# Patient Record
Sex: FEMALE | Race: WHITE | NOT HISPANIC OR LATINO | Employment: FULL TIME | URBAN - METROPOLITAN AREA
[De-identification: names, ages, dates, MRNs, and addresses within clinical notes are randomized per-mention and may not be internally consistent; named-entity substitution may affect disease eponyms.]

---

## 2018-08-28 ENCOUNTER — TELEPHONE (OUTPATIENT)
Dept: GASTROENTEROLOGY | Facility: CLINIC | Age: 33
End: 2018-08-28

## 2018-08-28 NOTE — TELEPHONE ENCOUNTER
----- Message from Milana Vicente sent at 8/28/2018  2:40 PM CDT -----  Regarding: Outside patient referral  Good afternoon,    Patient is being referred to Dr. Jameson for ulcerative colitis. Referral and records are scanned into . Please advise of any additional information needed prior to scheduling.    Thanks!  Milana

## 2018-08-29 ENCOUNTER — TELEPHONE (OUTPATIENT)
Dept: GASTROENTEROLOGY | Facility: CLINIC | Age: 33
End: 2018-08-29

## 2018-08-29 NOTE — TELEPHONE ENCOUNTER
----- Message from Kylee Birch MA sent at 8/29/2018  3:38 PM CDT -----  Contact: [phone]  Patient Returning Call from [facility]    Who Left Message for Patient: Eulalia    Communication Preference: [phone]    Additional Information: re: outside records info and appt access

## 2018-09-26 ENCOUNTER — TELEPHONE (OUTPATIENT)
Dept: GASTROENTEROLOGY | Facility: CLINIC | Age: 33
End: 2018-09-26

## 2018-09-26 NOTE — TELEPHONE ENCOUNTER
Pt is scheduled for a new patient clinic appt with Dr. CLEOPATRA Jameson on 10/1/2018.    E-mail sent to pt with appt reminder, new patient welcome letter, as well as a campus map.

## 2018-09-27 NOTE — PROGRESS NOTES
"                    [facility]          Inflammatory Bowel Disease New Patient Consultation Note         TODAY'S VISIT DATE:  9/27/2018    Reason for Consult:    Chief Complaint   Patient presents with    Ulcerative Colitis     PCP: No primary care provider on file.      Referring MD:   Dr. Rolly Stephens  [address] [place] [address]    History of Present Illness:  Dear. Dr. Stephens    Thank you for requesting a consultation on your patient Emily Mcclellan who is a 33 y.o. 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  Workup at that time included colonoscopy showed internal hemorrhoid and perhaps a "polyp."  She had celiac testing done which was negative. The continued with these symptoms occasionally but symptoms became more frequent and she was placed on bentyl which did not help. Immodium would provide some help.  Eliminating dairy and red meat did not help.  In 12/2013 she was hospitalized for 5 days with symptoms of diarrhea (up to 10 BMs/day that are soft with blood) and new rash (bilateral LE- small macular erythema and focal tenederness that advanced to raised plaque like lesions with pruritus and worsening lesion on dorsal foot with ankle pain). She had developed LE purpura and edema with biopsies c/w with leukocytoclastic vasculitis with HSP. At that time she had abdominal discomfort and bloody stool. Colonoscopy on 12/19/13 showed essentially pan ulcerative colitis however inability to fully rule out IgA vasculitis involving the colon. Colonoscopy showed specifically " inflammation characterized by friability, granularity, loss of vascularity, confluent ulceration found in continuous and circumferential pattern from the rectum to the cecum and moderate severity in the ascending and cecum.  Biopsies of the ascending colon and left colon showed moderately active chronic colitis.  The TI was normal including biopsies of the TI. For her skin lesions she was given triamcinolone 0.1% cream and for her new onset diagnosis of ulcerative colitis she started IV solumedrol and then prednisone 40 mg po daily.  She met Dr. Stephens at [facility] on 1/6/14 at which time he planned on tapering prednisone 40 mg po dailiy and then started lialda 4.8 g/day though referred to nephrology to make sure there was no kidney involvement of HSP and did not feel kidney biopsy was needed.  Dr. Knight with nephrology at [facility] saw patient on 2/27/14 and remarked UA earlier that week showed 3+ blood c/w microscopic hematuria (menstruating so false positive determined).      By 4/2014 she saw GI and was feeling well and had started tapering prednisone and on lialda 4.8 g/day. She was getting monthly UAs with nephrology due to microscopic hematuria with a plan for flex sig which was essentially normal in 9/23/14 with biopsies normal.  In 12/2014 (stool calprotectin 460) she had mild symptoms with urgency and blood in stool and increased frequency. On 1/28/15 patient had flex sig which showed moderately active proctosigmoiditis with biopsies confirming active IBD. She took another course of prednisone and then added imuran 100 mg po daily and started simponi 2/2015.  Patient seen by dermatology 5/19/16 for multiple melanocytic nevi though no worrisome findings for skin cancer and was told to f/u in 1 year. Patient saw Dr. Stephens 6/26/17 at which time she was in remission on Simponi, azathioprine and lialda 4.8 g/day.  It was recommended that she have a repeat flex sig winter 4161-3984. She is currently 27  weeks pregnant and due date 18.     She is currently having 1-2 formed BMs/day with no urgency blood or nocturnal bowel movements. Dr. Sara Hirsch is her OB and she is seeing her tomorrow. She has had nausea/vomiting throughout with pregnancy and some associated weight loss (doxylamine/B6 prescribed but not taking). She has is having a lot of heartburn with some improvement with zantac. Patient has no other gastrointestinal/constitutional complaints including no fevers or chills, weight loss, nausea/vomiting (including no hematemesis), dysphagia, abdominal pain. Also patient does not have any extraintestinal manifestations of their inflammatory bowel disease including no eye pain/redness, skin lesions/rashes, joint problems, oral ulcers.    Prior Pertinent Surgeries:  None    Pertinent Endoscopy/Imagin2013 skin biopsy: leukocytoclastic vasculitis with HSP- path- IgA deposits around vessels  2013 colonoscopy: inflammation characterized by friability, granularity, loss of vascularity, confluent ulceration found in continuous and circumferential pattern from the rectum to the cecum, moderate in severity in the ascending and cecum (path-ascending, left colon: moderately active chronic colitis); normal TI up to 5 cm (path: normal)  2014 flex sig: no report (path-rectum, sigmoid: normal)  2015 flex sig: rectum was diffusely erythematous and friable; there was mucous noted and spontaneous hemorrhage (path-rectum: diffuse moderately active IBD; no abnormal immunoglobulin deposition)    Pertinent Labs:  14 stool calprotectin 460  2/19/15 TB quantiferon negative  2/19/15 TPMT normal 16.4  2/19/15 HBsAb positive  2/19/15 HBtotal Core Ab negative   3/7/16 HBsAg negative    Therapeutic Drug Monitoring Labs:  3/26/15 6TG 130/6MMP <5700  4/29/15 6TG 210, 6MMP 6408    Prior IBD Therapies:  Prednisone    Current IBD Therapies:  Simponi 100 mg SC every 4 weeks  Azathioprine 100 mg po daily  Lialda  4.8 gm/day    Vaccinations:  Influenza (inactive):  2017  Pneumococcal PCV 13: never had  Pneumococcal PCV 23: never had  Tetanus (TdaP): recommended every 10 years  HPV (males and females ages 19-27 yo):     NA  Meningococcal (risk factors- complement component deficiency, spleen damage or splenectomy, HIV, traveling to endemic areas, college student residing in residence [place],  recruits):  Likely had but will get vaccine records  Hepatitis B:  HBsAb positive 2/19/15  No results found for: HEPBSAB  Hepatitis A (risk factors- traveling to high endemic areas, chronic liver disease, clotting factor disorders, MSM, illicit drug users):   HAV IgG ordered today  No results found for: HEPAIGG  MMR (live vaccine):    3/2016  Chickenpox status/Varicella (live vaccine):  Had chickenpox as a child  No results found for: VARICELLAZOS, VARICELLAINT  Shingrix:  Recommended     NSAID use/indication:  None    Narcotic use:  None    Alternative/Complementary Meds for IBD:  None    Review of Systems   Constitutional: Negative for chills, fever and weight loss.   HENT:        No oral ulcers, dysphagia, oral thrush   Eyes: Negative for blurred vision, pain and redness.   Respiratory: Negative for cough and shortness of breath.    Cardiovascular: Negative for chest pain.   Gastrointestinal: Negative for abdominal pain, heartburn, nausea and vomiting.   Genitourinary: Negative for dysuria and hematuria.   Musculoskeletal: Negative for back pain and joint pain.   Skin: Negative for rash.   Psychiatric/Behavioral: Negative for depression. The patient is not nervous/anxious and does not have insomnia.      Medical/Surgical History:    has a past medical history of GERD (gastroesophageal reflux disease) and Ulcerative colitis.   has a past surgical history that includes Tonsillectomy.    Family History:   family history includes Inflammatory bowel disease in her maternal aunt; Skin cancer in her maternal aunt and maternal  "grandmother.    Social History:    reports that  has never smoked. she has never used smokeless tobacco. She reports that she drinks about 0.6 oz of alcohol per week. She reports that she does not use drugs.    Review of patient's allergies indicates:  No Known Allergies    Current Medications:   Outpatient Medications Marked as Taking for the 10/1/18 encounter (Office Visit) with Ryder Jameson MD   Medication Sig Dispense Refill    azaTHIOprine (IMURAN) 50 mg Tab Take 100 mg by mouth once daily.      doxylamine succinate (UNISOM, DOXYLAMINE,) 25 mg tablet Take 25 mg by mouth nightly as needed for Insomnia.      golimumab (SIMPONI) 100 mg/mL PnIj Inject 100 mg into the skin every 28 days.      mesalamine (LIALDA) 1.2 gram TbEC Take 4.8 g by mouth once daily.      prenatal vit/iron fum/folic ac (PRENATAL 1+1 ORAL) Take 1 tablet by mouth once daily.      pyridoxine, vitamin B6, (VITAMIN B-6) 25 MG Tab Take 25 mg by mouth 3 (three) times daily.      ranitidine (ZANTAC) 150 MG tablet Take 150 mg by mouth 2 (two) times daily.       Vital Signs:  /68 (BP Location: Right arm, Patient Position: Sitting)   Pulse 79 Comment: O2: 99%  Temp 98.6 °F (37 °C)   Ht 5' 5" (1.651 m)   Wt 71.8 kg (158 lb 4.6 oz)   BMI 26.34 kg/m²     Physical Exam   Constitutional: She is oriented to person, place, and time. She appears well-developed.   HENT:   Mouth/Throat: Oropharynx is clear and moist. No oral lesions.   No oral ulcers or thrush   Eyes: Conjunctivae are normal. Pupils are equal, round, and reactive to light.   Cardiovascular: Normal rate and regular rhythm.   Pulmonary/Chest: Effort normal and breath sounds normal.   Abdominal: Soft. There is no tenderness.   Palpable fundus   Musculoskeletal:        Right lower leg: She exhibits no swelling.        Left lower leg: She exhibits no swelling.   Neurological: She is alert and oriented to person, place, and time.   Skin: No rash noted.   Psychiatric: She has a " "normal mood and affect.   Nursing note and vitals reviewed.    Labs: reviewed and pertinent noted above    Assessment/Plan:  Emily Mcclellan is a 33 y.o. 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  She developed symptoms of LLQ abdominal pain with mucous, rectal pain, and occasional blood mixed in her stool in approximately 2009.  She had a workup that included a colonoscopy that was normal except for some internal hemorrhoids and perhaps a "polyp" and negative celiac testing.  She continued with symptoms which became more frequent that was not relieved with bentyl or eliminating dairy or red meat.  Imodium did help some.  She developed worsening diarrhea (up to 10 BMs/day) that was soft with blood and a new rash (bilateral LE- small macular erythema and focal tenederness that advanced to raised plaque like lesions with pruritus and worsening lesion on dorsal foot with ankle pain) that prompted a hospitalization for 5 days.  While in the hospital, she developed LE purpura and edema with biopsies consistent with leukocytoclastic vasculitis with HSP.  She had a colonoscopy on 12/19/2013 that showed ulcerative pancolitis with normal TI however inability to fully r/o IgA vasculitis involving the colon.  For her skin lesions, she was given triamcinolone 0.1% cream and for her new onset diagnosis of UC she was started on IV solumedrol then transitioned to oral prednisone 40 mg PO daily.  She established care with Dr. Rolly Stephens at [facility] on 1/6/2014 st which time he tapered her prednisone and started her on lialda 4.8 gm/day then referred her to nephrology to ensure there was no kidney involvement of HSP.  Dr. Knigth with nephrology at [facility] saw patient on 2/27/14 and remarked UA earlier that week showed 3+ blood c/w microscopic hematuria (menstruating so false positive determined) and did not feel that a kidney biopsy was needed.  She continued monthly " UAs and flex sig on 2014 was essentially normal with normal biopsies.  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  She had another course of prednisone and was started on imuran 100 mg PO daily and simponi in 2015.  By 2017 she had a clinic f/u with Dr. Stephens and was in clinic remission on simponi, lialda, and imuran.  He recommended a repeat flex sig in winter 6106-3470.  She became pregnant and is currently 27 weeks with a due date of 2018 and feeling well with 1-2 formed BMs/day.  She remains on Simponi 100 mg every 4 weeks, Imuran 50 mg PO daily, and Lialda 4.8 gm/day.      Patient is currently 27 weeks pregnant and is in clinical remission for her UC at conception and currently. The majority of her symptoms are related to her pregnancy including nausea/vomiting, fatigue.  She had no active GI symptoms. Today she is establishing care for her UC in our IBD clinic after moving to [place].  We will get labs and baseline stool calprotectin today with a plan for a colonoscopy after delivery.  We discussed timing of her simponi and for now will keep as scheduled given that its monthly and will not be able to stop during 3rd trimester without risk of flare. She continues on lialda 4.8 g/day and imuran 50 mg daily at this time and we will consider possibly stopping imuran or lialda after pregnancy.  We reminded her no live vaccines for the  for first 6 mos of life and breastfeeding is safe on simponi. Ideally if she can not breastfeed 4 hours after an imuran dose that would be ideal.  We will see her postpartum to discuss her care further.  We gave her written list of risks with imuran and simponi and its safety during pregnancy.      # Ulcerative pancolitis:  - I had a long discussion with patient regarding  epidemiology, potential etiologies, associations and triggers (avoiding NSAIDS, using antibiotics with caution,stress and smoking effects on disease state), diagnosis, management goals and treatment options   - colonoscopy postpartum will be considered   - stool calprotectin  - continue simponi 100 mg SC every 4 weeks  - continue imuran 50 mg po daily  - continue lialda 4.8 g/day- may consider stopping in future but will discuss risks/benefits and won't stop during pregnancy or immediately postpartum  - basic labs: CBC, CMP, ESR, CRP, vitamin B12, HCV Ab, HIV, thyroid testing, celiac Ab testing   - drug monitoring labs: TPMT (normal 16.4, 2015), TB quantiferon (negative 2015, repeat today), Hep B testing (HBsAg, HBtotalcoreAb negative 2015, 3/2016)    # Pregnancy-27 weeks pregnant  - no Live vaccines for the  for the first 6 months after birth  - breastfeeding is safe after delivery and try to avoid breastfeeding 4 hours after imuran ingestion    # IBD specific health maintenance:  Colorectal cancer risk:    Risks factors: personal history of colon polyps ( possible polyp- pt will get us the path and colonoscopy results from [place]) and >1/3 of colon involved with colitis and >8-10 years of IBD duration  - Distribution of colonic disease:  pancolitis  - Year of symptom onset:   - colonoscopy:  every 1-2 years for surveillance once in endoscopic remission    Pap smear recommended yearly - deferred to OBGYN while pregnant  Opthamologic exam recommended yearly - next due now  Dermatologic exam recommended yearly - next due now, last one     Bone health:  Risk of osteopenia/osteoporosis:  Risks factors: none  Vitamin D: vitamin D level ordered today    Vaccine counseling:  - No LIVE VACCINES--reminded in clinic today  - VZV IgG, HAV IgG  - referral to Infectious Disease clinic to get up to date on vaccinations- referral post-partum    Follow up: 2019 after delivery and pt will keep us posted  about her delivery    Thank you again for sending Emily Mcclellan to see Dr. Ryder Jameson today at the [facility]. Please don't hesitate to contact Dr. Jameson if there are any questions regarding this evaluation, or if you have any other patients with inflammatory bowel disease for whom you would like a consultation. You can reach Dr. Jameson at [phone] or by email at [email or web address]    History, physical exam, assessment and plan were performed by me personally. NP, Erin Cruz was present during entire visit     Ryder Jameson MD  Department of Gastroenterology  Medical Director, Inflammatory Bowel Disease

## 2018-10-01 ENCOUNTER — OFFICE VISIT (OUTPATIENT)
Dept: GASTROENTEROLOGY | Facility: CLINIC | Age: 33
End: 2018-10-01
Payer: COMMERCIAL

## 2018-10-01 ENCOUNTER — PATIENT MESSAGE (OUTPATIENT)
Dept: GASTROENTEROLOGY | Facility: CLINIC | Age: 33
End: 2018-10-01

## 2018-10-01 ENCOUNTER — TELEPHONE (OUTPATIENT)
Dept: PHARMACY | Facility: CLINIC | Age: 33
End: 2018-10-01

## 2018-10-01 ENCOUNTER — LAB VISIT (OUTPATIENT)
Dept: LAB | Facility: HOSPITAL | Age: 33
End: 2018-10-01
Attending: INTERNAL MEDICINE
Payer: COMMERCIAL

## 2018-10-01 VITALS
TEMPERATURE: 99 F | SYSTOLIC BLOOD PRESSURE: 115 MMHG | DIASTOLIC BLOOD PRESSURE: 68 MMHG | HEART RATE: 79 BPM | BODY MASS INDEX: 26.38 KG/M2 | HEIGHT: 65 IN | WEIGHT: 158.31 LBS

## 2018-10-01 DIAGNOSIS — K51.00 ULCERATIVE PANCOLITIS: ICD-10-CM

## 2018-10-01 DIAGNOSIS — Z3A.27 27 WEEKS GESTATION OF PREGNANCY: ICD-10-CM

## 2018-10-01 DIAGNOSIS — K51.00 ULCERATIVE PANCOLITIS: Primary | ICD-10-CM

## 2018-10-01 LAB
25(OH)D3+25(OH)D2 SERPL-MCNC: 31 NG/ML
ALBUMIN SERPL BCP-MCNC: 3.1 G/DL
ALP SERPL-CCNC: 63 U/L
ALT SERPL W/O P-5'-P-CCNC: 11 U/L
ANION GAP SERPL CALC-SCNC: 8 MMOL/L
AST SERPL-CCNC: 16 U/L
BASOPHILS # BLD AUTO: 0.03 K/UL
BASOPHILS NFR BLD: 0.3 %
BILIRUB SERPL-MCNC: 0.3 MG/DL
BUN SERPL-MCNC: 8 MG/DL
CALCIUM SERPL-MCNC: 9 MG/DL
CHLORIDE SERPL-SCNC: 105 MMOL/L
CO2 SERPL-SCNC: 23 MMOL/L
CREAT SERPL-MCNC: 0.6 MG/DL
CRP SERPL-MCNC: 0.8 MG/L
DIFFERENTIAL METHOD: ABNORMAL
EOSINOPHIL # BLD AUTO: 0.2 K/UL
EOSINOPHIL NFR BLD: 1.8 %
ERYTHROCYTE [DISTWIDTH] IN BLOOD BY AUTOMATED COUNT: 13.9 %
EST. GFR  (AFRICAN AMERICAN): >60 ML/MIN/1.73 M^2
EST. GFR  (NON AFRICAN AMERICAN): >60 ML/MIN/1.73 M^2
GLUCOSE SERPL-MCNC: 85 MG/DL
HBV CORE AB SERPL QL IA: NEGATIVE
HBV SURFACE AB SER-ACNC: POSITIVE M[IU]/ML
HBV SURFACE AG SERPL QL IA: NEGATIVE
HCT VFR BLD AUTO: 32.9 %
HEPATITIS A ANTIBODY, IGG: POSITIVE
HGB BLD-MCNC: 10.7 G/DL
IGA SERPL-MCNC: 75 MG/DL
IMM GRANULOCYTES # BLD AUTO: 0.08 K/UL
IMM GRANULOCYTES NFR BLD AUTO: 0.8 %
LYMPHOCYTES # BLD AUTO: 1.9 K/UL
LYMPHOCYTES NFR BLD: 19.2 %
MCH RBC QN AUTO: 32 PG
MCHC RBC AUTO-ENTMCNC: 32.5 G/DL
MCV RBC AUTO: 99 FL
MONOCYTES # BLD AUTO: 0.4 K/UL
MONOCYTES NFR BLD: 4.2 %
NEUTROPHILS # BLD AUTO: 7.4 K/UL
NEUTROPHILS NFR BLD: 73.7 %
NRBC BLD-RTO: 0 /100 WBC
PLATELET # BLD AUTO: 218 K/UL
PMV BLD AUTO: 10 FL
POTASSIUM SERPL-SCNC: 3.6 MMOL/L
PROT SERPL-MCNC: 6.8 G/DL
RBC # BLD AUTO: 3.34 M/UL
SODIUM SERPL-SCNC: 136 MMOL/L
T4 FREE SERPL-MCNC: 0.94 NG/DL
TSH SERPL DL<=0.005 MIU/L-ACNC: 0.97 UIU/ML
VIT B12 SERPL-MCNC: 181 PG/ML
WBC # BLD AUTO: 10 K/UL

## 2018-10-01 PROCEDURE — 82607 VITAMIN B-12: CPT

## 2018-10-01 PROCEDURE — 83516 IMMUNOASSAY NONANTIBODY: CPT

## 2018-10-01 PROCEDURE — 86704 HEP B CORE ANTIBODY TOTAL: CPT

## 2018-10-01 PROCEDURE — 99214 OFFICE O/P EST MOD 30 MIN: CPT | Mod: PBBFAC | Performed by: INTERNAL MEDICINE

## 2018-10-01 PROCEDURE — 84439 ASSAY OF FREE THYROXINE: CPT

## 2018-10-01 PROCEDURE — 82306 VITAMIN D 25 HYDROXY: CPT

## 2018-10-01 PROCEDURE — 84443 ASSAY THYROID STIM HORMONE: CPT

## 2018-10-01 PROCEDURE — 86706 HEP B SURFACE ANTIBODY: CPT

## 2018-10-01 PROCEDURE — 86787 VARICELLA-ZOSTER ANTIBODY: CPT

## 2018-10-01 PROCEDURE — 87340 HEPATITIS B SURFACE AG IA: CPT

## 2018-10-01 PROCEDURE — 36415 COLL VENOUS BLD VENIPUNCTURE: CPT

## 2018-10-01 PROCEDURE — 86140 C-REACTIVE PROTEIN: CPT

## 2018-10-01 PROCEDURE — 99999 PR PBB SHADOW E&M-EST. PATIENT-LVL IV: CPT | Mod: PBBFAC,,, | Performed by: INTERNAL MEDICINE

## 2018-10-01 PROCEDURE — 85025 COMPLETE CBC W/AUTO DIFF WBC: CPT

## 2018-10-01 PROCEDURE — 80053 COMPREHEN METABOLIC PANEL: CPT

## 2018-10-01 PROCEDURE — 99245 OFF/OP CONSLTJ NEW/EST HI 55: CPT | Mod: S$GLB,,, | Performed by: INTERNAL MEDICINE

## 2018-10-01 PROCEDURE — 82542 COL CHROMOTOGRAPHY QUAL/QUAN: CPT

## 2018-10-01 PROCEDURE — 82784 ASSAY IGA/IGD/IGG/IGM EACH: CPT

## 2018-10-01 PROCEDURE — 86790 VIRUS ANTIBODY NOS: CPT

## 2018-10-01 RX ORDER — MESALAMINE 1.2 G/1
4.8 TABLET, DELAYED RELEASE ORAL DAILY
COMMUNITY
Start: 2018-07-24 | End: 2018-10-01 | Stop reason: SDUPTHER

## 2018-10-01 RX ORDER — AZATHIOPRINE 50 MG/1
100 TABLET ORAL DAILY
Qty: 60 TABLET | Refills: 2 | Status: SHIPPED | OUTPATIENT
Start: 2018-10-01 | End: 2018-12-14 | Stop reason: SDUPTHER

## 2018-10-01 RX ORDER — AZATHIOPRINE 50 MG/1
100 TABLET ORAL DAILY
COMMUNITY
Start: 2018-07-24 | End: 2018-10-01 | Stop reason: SDUPTHER

## 2018-10-01 RX ORDER — PYRIDOXINE HCL (VITAMIN B6) 25 MG
25 TABLET ORAL 3 TIMES DAILY
COMMUNITY
End: 2019-01-22

## 2018-10-01 RX ORDER — MESALAMINE 1.2 G/1
4.8 TABLET, DELAYED RELEASE ORAL DAILY
Qty: 120 TABLET | Refills: 11 | Status: SHIPPED | OUTPATIENT
Start: 2018-10-01 | End: 2019-10-06 | Stop reason: SDUPTHER

## 2018-10-01 NOTE — PATIENT INSTRUCTIONS
Instructions:  - labs, UA today  - stool calprotectin, please turn in this week   - continue Simponi 100 mg every 4 weeks, next due 10/28/2018--we may make adjustments to your dose as you get closer to your due date  - continue Imuran 100 mg PO daily   - continue Lialda 4.8 gm/day  - no Live vaccines for the  for the first 6 months after birth  - breastfeeding is safe after delivery  - sign up for [facility]  - Avoid all NSAIDs (Advil, Ibuprofen, Motrin, Aspirin, Naprosyn, Aleve)  - Yearly Pap Smears recommended-per OBGYN while pregnant  - Yearly Eye exam due now  - Yearly Skin exam--wear sun block and hats - due now  - Use antibiotics with caution  - For Dental Procedures, use antibiotics only if absolutely necessary  - Please make sure you establish care with a PCP  - If you have to take antibiotics for any infection, please take a 2 week course of OTC Florastor 1 capsule twice daily probiotic after completion of the antibiotic course  - Vaccines recommended- check with OBGYN   Flu shot yearly  Tetanus every 10 years  Hepatitis A series  Pneumonia 13  (PCV13) vaccine initial  Pneumonia 23 (PPSV23) vaccine 1 year after PCV13, then an additional dose in 5 years, then again at age 65  Shingrix series  - Follow up with Dr. Jameson after delivery in end of 2019     Golimumab (Simponi): Biologics - Anti-TNF Agent  - Mechanism of action:  simponi blocks TNF alpha which plays a role in the inflammatory process for ulcerative colitis  - Labs     - Repeat labs will be drawn every 3 months to monitor for side effects    - TB test (Quantiferon Gold) will be checked yearly or sooner if needed    Simponi Dosage:  - Maintenance Dose: 100 mg SC every 4 weeks (1 pen)    Risks of Simponi:  Stop therapy due to adverse event= 10% (10/100)    Please call us immediately if you develop any of the below problems    Allergic reactions  - <2% (2/100) develop injection site reactions  - RARE- hives (rash), difficulty breathing,  chest pain/tightness, high or low blood pressure, swelling of the face and hands, fever or chills    Serious Infections= 3% (3/100)  fever, tiredness, flu, open sores, warm red painful skin    Blood Disorders  fever that doesn't go away, bruising, bleeding, severe paleness    Non-Hodgkins Lymphoma=0.06% (6/10,000)    Drug related lupus-like reaction= 1% (1/100)  chest discomfort or pain that does not go away, shortness of breath, joint pain, rash on the cheeks or arms that gets worse in the sun    Psoriasis  new or worsening red scaly patches or raised bumps on the skin that are filled with pus     Case Reports Only: Multiple Sclerosis and Guillain Amity Syndrome  Numbness/weakness/tingling of your hands and feet, changes in your vision or seizures    Case Reports Only: New or worsening Congestive Heart Failure  shortness of breath, swelling in your ankles or feet, sudden weight gain    Case Reports Only: Serious Liver Injury  jaundice (yellow skin or eyes), dark brown urine, right-sided abdominal pain, fever, severe itchiness    Vaccine Counseling  See above     - NO LIVE vaccines during therapy   - Live Vaccines Include:   --Intranasal Influenza A/B  --Measles, Mumps, Rubella (MMR)  --Rotavirus  --Typhoid (oral)  --Vaccina (Smallpox)  --Varicella (Chicken Pox)  --Yellow Fever  --Zoster

## 2018-10-02 ENCOUNTER — TELEPHONE (OUTPATIENT)
Dept: GASTROENTEROLOGY | Facility: CLINIC | Age: 33
End: 2018-10-02

## 2018-10-02 ENCOUNTER — PATIENT MESSAGE (OUTPATIENT)
Dept: GASTROENTEROLOGY | Facility: CLINIC | Age: 33
End: 2018-10-02

## 2018-10-02 DIAGNOSIS — K51.00 ULCERATIVE PANCOLITIS: Primary | ICD-10-CM

## 2018-10-02 PROBLEM — Z3A.27 27 WEEKS GESTATION OF PREGNANCY: Status: ACTIVE | Noted: 2018-10-02

## 2018-10-02 LAB — TTG IGA SER IA-ACNC: 2 UNITS

## 2018-10-02 NOTE — TELEPHONE ENCOUNTER
----- Message from Jess Garcia sent at 10/2/2018  4:21 PM CDT -----  Regarding: Simponi   FYI:  The patient's insurance requires the patient to fill Simponi  through [facility]. They will not initiate the prior authorization process until there is a rejected claim at the required pharmacy. Please send a prescription to [facility], which has been added to the patient's EPIC profile.     To complete this in EPIC, the original order MUST be discontinued and re-typed as a new prescription with the updated pharmacy listed. Clicking reorder will continue to route the rx to [facility] even if the pharmacy is changed. Please opt the patient out of [facility] when the BPA is fired.    Thanks,   Jess x [phone]

## 2018-10-03 LAB
VARICELLA INTERPRETATION: POSITIVE
VARICELLA ZOSTER IGG: 3.79 ISR

## 2018-10-04 LAB — PROMETHEUS THIOPURINE METABOLITES: NORMAL

## 2018-10-05 ENCOUNTER — PATIENT MESSAGE (OUTPATIENT)
Dept: GASTROENTEROLOGY | Facility: CLINIC | Age: 33
End: 2018-10-05

## 2018-10-08 ENCOUNTER — PATIENT MESSAGE (OUTPATIENT)
Dept: GASTROENTEROLOGY | Facility: CLINIC | Age: 33
End: 2018-10-08

## 2018-10-08 NOTE — TELEPHONE ENCOUNTER
DOCUMENTATION ONLY:  [place] prior authorization approved x 1 year  Dates: 10/8/18 through 10/8/19  Claim #: [id number]    Patient must use [facility].   [phone] (Phone)  [phone] (Fax)

## 2018-10-10 ENCOUNTER — PATIENT MESSAGE (OUTPATIENT)
Dept: GASTROENTEROLOGY | Facility: CLINIC | Age: 33
End: 2018-10-10

## 2018-11-27 ENCOUNTER — PATIENT MESSAGE (OUTPATIENT)
Dept: GASTROENTEROLOGY | Facility: CLINIC | Age: 33
End: 2018-11-27

## 2018-12-14 ENCOUNTER — PATIENT MESSAGE (OUTPATIENT)
Dept: GASTROENTEROLOGY | Facility: CLINIC | Age: 33
End: 2018-12-14

## 2018-12-14 DIAGNOSIS — K51.00 ULCERATIVE PANCOLITIS: ICD-10-CM

## 2018-12-14 RX ORDER — AZATHIOPRINE 50 MG/1
TABLET ORAL
Qty: 60 TABLET | Refills: 1 | Status: SHIPPED | OUTPATIENT
Start: 2018-12-14 | End: 2019-03-16 | Stop reason: SDUPTHER

## 2019-01-22 ENCOUNTER — OFFICE VISIT (OUTPATIENT)
Dept: GASTROENTEROLOGY | Facility: CLINIC | Age: 34
End: 2019-01-22
Payer: COMMERCIAL

## 2019-01-22 VITALS
HEIGHT: 65 IN | DIASTOLIC BLOOD PRESSURE: 77 MMHG | BODY MASS INDEX: 26.48 KG/M2 | WEIGHT: 158.94 LBS | SYSTOLIC BLOOD PRESSURE: 120 MMHG | TEMPERATURE: 98 F | HEART RATE: 68 BPM

## 2019-01-22 DIAGNOSIS — Z79.60 LONG-TERM USE OF IMMUNOSUPPRESSANT MEDICATION: ICD-10-CM

## 2019-01-22 DIAGNOSIS — K51.00 ULCERATIVE PANCOLITIS: Primary | ICD-10-CM

## 2019-01-22 DIAGNOSIS — E53.8 VITAMIN B12 DEFICIENCY: ICD-10-CM

## 2019-01-22 PROBLEM — Z3A.27 27 WEEKS GESTATION OF PREGNANCY: Status: RESOLVED | Noted: 2018-10-02 | Resolved: 2019-01-22

## 2019-01-22 PROCEDURE — 99999 PR PBB SHADOW E&M-EST. PATIENT-LVL III: ICD-10-PCS | Mod: PBBFAC,,, | Performed by: INTERNAL MEDICINE

## 2019-01-22 PROCEDURE — 99214 PR OFFICE/OUTPT VISIT, EST, LEVL IV, 30-39 MIN: ICD-10-PCS | Mod: S$GLB,,, | Performed by: INTERNAL MEDICINE

## 2019-01-22 PROCEDURE — 99214 OFFICE O/P EST MOD 30 MIN: CPT | Mod: S$GLB,,, | Performed by: INTERNAL MEDICINE

## 2019-01-22 PROCEDURE — 99999 PR PBB SHADOW E&M-EST. PATIENT-LVL III: CPT | Mod: PBBFAC,,, | Performed by: INTERNAL MEDICINE

## 2019-01-22 RX ORDER — MULTIVIT WITH MINERALS/HERBS
1 TABLET ORAL DAILY
COMMUNITY
End: 2019-12-04

## 2019-01-22 NOTE — PATIENT INSTRUCTIONS
Instructions:  - colonoscopy (moviprep)- schedule when you can, call [phone]  - prevnar 13 and then 2-12 mos later pneumovax 23, shingrix when available  - NO LIVE VACCINES  - make sure you get your OB to send me CBC/LFTs- need this every 3 mos so make sure you - get them again in last week of march 2019 ([facility] is an option and is connected our epic)  - follow up in end of June 2019 with ERYN Cruz- pt prefers a friday

## 2019-01-22 NOTE — PROGRESS NOTES
"     [facility]             Inflammatory Bowel Disease Follow-up  Note              TODAY'S VISIT DATE:  1/22/2019    Chief Complaint:   Chief Complaint   Patient presents with    Ulcerative pancolitis       PCP: Primary Doctor No    Previous History:  Emily Mcclellan is a 33 y.o. 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  She developed symptoms of LLQ abdominal pain with mucous, rectal pain, and occasional blood mixed in her stool in approximately 2009.  She had a workup that included a colonoscopy that was normal except for some internal hemorrhoids and perhaps a "polyp" and negative celiac testing.  She continued with symptoms which became more frequent that was not relieved with bentyl or eliminating dairy or red meat.  Imodium did help some.  She developed worsening diarrhea (up to 10 BMs/day) that was soft with blood and a new rash (bilateral LE- small macular erythema and focal tenederness that advanced to raised plaque like lesions with pruritus and worsening lesion on dorsal foot with ankle pain) that prompted a hospitalization for 5 days.  While in the hospital, she developed LE purpura and edema with biopsies consistent with leukocytoclastic vasculitis with HSP.  She had a colonoscopy on 12/19/2013 that showed ulcerative pancolitis with normal TI however inability to fully r/o IgA vasculitis involving the colon.  For her skin lesions, she was given triamcinolone 0.1% cream and for her new onset diagnosis of UC she was started on IV solumedrol then transitioned to oral prednisone 40 mg PO daily.  She established care with Dr. Rolly Stephens at [facility] on 1/6/2014 st which time he tapered her prednisone and started her on lialda 4.8 gm/day then referred her to nephrology to ensure there was no kidney involvement of HSP.  Dr. Knight with nephrology at [facility] saw patient on 2/27/14 and remarked UA earlier that week showed 3+ " blood c/w microscopic hematuria (menstruating so false positive determined) and did not feel that a kidney biopsy was needed.  She continued monthly UAs and flex sig on 2014 was essentially normal with normal biopsies.  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  She had another course of prednisone and was started on imuran 100 mg PO daily and simponi in 2015.  By 2017 she had a clinic f/u with Dr. Stephens and was in clinic remission on simponi, lialda, and imuran.  He recommended a repeat flex sig in winter 3526-8072.  She became pregnant and is currently 27 weeks with a due date of 2018 and feeling well with 1-2 formed BMs/day.  She remains on Simponi 100 mg every 4 weeks, Imuran 50 mg PO daily, and Lialda 4.8 gm/day.      Interval History:  - current IBD meds: simponi 100 mg SC every 4 weeks (last dose 19, next dose 19), lialda 4.8 g/day  - vaginal delivery of healthy baby girl on 18, vaginal tear still causing discomfort and blood  - starting work 18   - 1-2 formed Bowel Movements/day and no nocturnal bowel movements  - postpartum anxiety  - breastfeeding- going well  - NSAID use: No  - Narcotic use: No  - Alternative/complementary meds for IBD: No    Prior Pertinent Surgeries:   None    Pertinent Endoscopy/Imagin2013 skin biopsy: leukocytoclastic vasculitis with HSP- path- IgA deposits around vessels  2013 colonoscopy: inflammation characterized by friability, granularity, loss of vascularity, confluent ulceration found in continuous and circumferential pattern from the rectum to the cecum, moderate in severity in the ascending and cecum (path-ascending, left colon: moderately active chronic colitis); normal TI up to 5 cm (path: normal)  2014 flex sig: no report (path-rectum,  sigmoid: normal)  1/28/2015 flex sig: rectum was diffusely erythematous and friable; there was mucous noted and spontaneous hemorrhage (path-rectum: diffuse moderately active IBD; no abnormal immunoglobulin deposition)    Pertinent Labs:  12/5/14 stool calprotectin 460  2/19/15 TPMT normal 16.4  Lab Results   Component Value Date    CRP 0.8 10/01/2018     Lab Results   Component Value Date    TTGIGA 2 10/01/2018    IGA 75 10/01/2018     Lab Results   Component Value Date    TSH 0.968 10/01/2018    FREET4 0.94 10/01/2018     Lab Results   Component Value Date    KTSNMTJA36NX 31 10/01/2018    AWRDHGZO14 181 (L) 10/01/2018     Lab Results   Component Value Date    HEPBSAG Negative 10/01/2018    HEPBCAB Negative 10/01/2018     No results found for: VWQ04ZHPJ  Lab Results   Component Value Date    NIL 0.030 10/01/2018    MITOGENNIL >10.000 10/01/2018     No results found for: TPTMINTERP, TPMTRESULT  No results found for: STOOLCULTURE, GGWXWQFFLP9P, SDGXREQNPW7A, CDIFFICILEAN, CDIFFTOX, CDIFFICILEBY  Lab Results   Component Value Date    CALPROTECTIN 26.6 10/01/2018     Therapeutic Drug Monitoring Labs:  3/26/15 6TG 130/6MMP <5700  4/29/15 6TG 210, 6MMP 6408    Prior IBD Therapies:  Prednisone    Current IBD Therapies:  Simponi 100 mg SC every 4 weeks  Azathioprine 100 mg po daily  Lialda 4.8 gm/day    Vaccinations:  Lab Results   Component Value Date    HEPBSAB Positive (A) 10/01/2018     Lab Results   Component Value Date    HEPAIGG Positive (A) 10/01/2018     Lab Results   Component Value Date    VARICELLAZOS 3.79 (H) 10/01/2018    VARICELLAINT Positive (A) 10/01/2018     Influenza (inactive):  Recommended yearly, last one 2018  Pneumococcal PCV 13: recommended- pt to get with PCP/[facility] and if not will get her this next time  Pneumococcal PCV 23: recommended  Tetanus (TdaP): 12/2018  HPV (males and females ages 19-25 yo): not had    Meningococcal: UTD  Hepatitis B:  immune  Hepatitis A:  immune  MMR (live vaccine):  UTD   "   Chickenpox status/Varicella (live vaccine): immune  Zoster (age >49 yo, live vaccine):  contraindicated  Shingrix (age >49 yo, non-live vaccine):  recommended    Review of Systems   Constitutional: Negative for chills, fever and weight loss.   HENT:        No oral ulcers, dysphagia, oral thrush   Eyes: Negative for blurred vision, pain and redness.   Respiratory: Negative for cough and shortness of breath.    Cardiovascular: Negative for chest pain.   Gastrointestinal: Negative for abdominal pain, heartburn, nausea and vomiting.   Genitourinary: Negative for dysuria and hematuria.   Musculoskeletal: Negative for back pain and joint pain.   Skin: Negative for rash.   Psychiatric/Behavioral: Negative for depression. The patient is nervous/anxious. The patient does not have insomnia.      All Medical History/Surgical History/Family History/Social History/Allergies have been reviewed and updated in EMR    Review of patient's allergies indicates:  No Known Allergies    Outpatient Medications Marked as Taking for the 1/22/19 encounter (Office Visit) with Ryder Jameson MD   Medication Sig Dispense Refill    azaTHIOprine (IMURAN) 50 mg Tab TAKE 2 TABLETS BY MOUTH EVERY DAY 60 tablet 1    b complex vitamins tablet Take 1 tablet by mouth once daily.      golimumab (SIMPONI) 100 mg/mL PnIj Inject 100 mg into the skin every 28 days. 1 mL 5    mesalamine (LIALDA) 1.2 gram TbEC Take 4 tablets (4.8 g total) by mouth once daily. 120 tablet 11    prenatal vit/iron fum/folic ac (PRENATAL 1+1 ORAL) Take 1 tablet by mouth once daily.         Vital Signs:  /77 (BP Location: Left arm, Patient Position: Sitting)   Pulse 68 Comment: O2: 98%  Temp 97.9 °F (36.6 °C)   Ht 5' 5" (1.651 m)   Wt 72.1 kg (158 lb 15.2 oz)   BMI 26.45 kg/m²     Physical Exam   Constitutional: She is oriented to person, place, and time. She appears well-developed.   HENT:   Mouth/Throat: Oropharynx is clear and moist. No oral lesions.   No oral " ulcers or thrush   Eyes: Conjunctivae are normal. Pupils are equal, round, and reactive to light.   Cardiovascular: Normal rate and regular rhythm.   Pulmonary/Chest: Effort normal and breath sounds normal.   Abdominal: Soft. There is no tenderness.   Musculoskeletal:        Right lower leg: She exhibits no swelling.        Left lower leg: She exhibits no swelling.   Neurological: She is alert and oriented to person, place, and time.   Skin: No rash noted.   Psychiatric: She has a normal mood and affect.   Nursing note and vitals reviewed.    Labs:   Lab Results   Component Value Date    WBC 10.00 10/01/2018    HGB 10.7 (L) 10/01/2018    HCT 32.9 (L) 10/01/2018    MCV 99 (H) 10/01/2018     10/01/2018     Lab Results   Component Value Date    CREATININE 0.6 10/01/2018    ALBUMIN 3.1 (L) 10/01/2018    BILITOT 0.3 10/01/2018    ALKPHOS 63 10/01/2018    AST 16 10/01/2018    ALT 11 10/01/2018     Lab Results   Component Value Date    NIL 0.030 10/01/2018    MITOGENNIL >10.000 10/01/2018       Assessment/Plan:  Emily Pennington is a 33 y.o. female with ulcerative pancolitis who is about 3-4 weeks postpartum and feeling well overall. She had vaginal delivery of term baby with vaginal tear and some issues with hemorrhoids at this time.  She will continue her current meds and is breastfeeding. Reminded no live vaccines for  for first 6 mos.  She will have a colonoscopy done for surveillance soon but otherwise no changes in her meds at this time. She had routine labs done end of last month and will make sure we received them and will make sure she gets labs every 3 mos and send to us (likely will get at [facility] or [facility] depending on cost).      # Ulcerative pancolitis/postpartum:  - stool calprotectin- normal-10/2018  - colonoscopy- ordered, nonurgent  - continue simponi 100 mg SC every 4 weeks  - continue imuran 100 mg po daily  - continue lialda 4.8 g/day  - drug monitoring labs: CBC/LFTs every 3 mos-ad  routine labs done end of last month and will make sure we received them and will make sure she gets labs every 3 mos and send to us (likely will get at [facility] or [facility] depending on cost); (TPMT (normal 16.4, 2/2015), TB quantiferon (negative 10/2018, repeat yearly or sooner if RFs), Hep B testing (HBsAg, HBtotalcoreAb negative 10/2018)    # Vitamin B12 deficiency:   - vit B12 1000 mcg SL daily  - repeat vit B12 in 6 mos in approximately 4/2019    # IBD specific health maintenance:  Colorectal cancer risk:    Risks factors:- Distribution of colonic disease:  pancolitis  - Year of symptom onset: 2009  - colonoscopy:  every 1-2 years- due now  Pap smear recommended yearly- will get this done  Opthamologic exam recommended yearly   Dermatologic exam recommended yearly     Bone health:  Risk of osteopenia/osteoporosis:  none    Vitamin D:   Lab Results   Component Value Date    RMMZITFR34AJ 31 10/01/2018     Vaccine counseling:  - No LIVE VACCINES   - needs pneumonia vaccines and will get these through [facility] and inform us once she gets them    Follow up: 6 months    Ryder Jameson MD  Department of Gastroenterology  Medical Director, Inflammatory Bowel Disease

## 2019-01-25 DIAGNOSIS — Z12.11 SPECIAL SCREENING FOR MALIGNANT NEOPLASMS, COLON: Primary | ICD-10-CM

## 2019-01-25 RX ORDER — POLYETHYLENE GLYCOL 3350, SODIUM SULFATE, SODIUM CHLORIDE, POTASSIUM CHLORIDE, SODIUM ASCORBATE, AND ASCORBIC ACID 7.5-2.691G
2000 KIT ORAL ONCE
Qty: 1 BOTTLE | Refills: 0 | Status: SHIPPED | OUTPATIENT
Start: 2019-01-25 | End: 2019-01-25

## 2019-01-29 ENCOUNTER — PATIENT MESSAGE (OUTPATIENT)
Dept: ENDOSCOPY | Facility: HOSPITAL | Age: 34
End: 2019-01-29

## 2019-02-03 ENCOUNTER — PATIENT MESSAGE (OUTPATIENT)
Dept: ENDOSCOPY | Facility: HOSPITAL | Age: 34
End: 2019-02-03

## 2019-02-12 ENCOUNTER — PATIENT MESSAGE (OUTPATIENT)
Dept: ENDOSCOPY | Facility: HOSPITAL | Age: 34
End: 2019-02-12

## 2019-02-14 ENCOUNTER — ANESTHESIA EVENT (OUTPATIENT)
Dept: ENDOSCOPY | Facility: HOSPITAL | Age: 34
End: 2019-02-14
Payer: COMMERCIAL

## 2019-02-15 ENCOUNTER — HOSPITAL ENCOUNTER (OUTPATIENT)
Facility: HOSPITAL | Age: 34
Discharge: HOME | End: 2019-02-15
Attending: INTERNAL MEDICINE | Admitting: INTERNAL MEDICINE
Payer: COMMERCIAL

## 2019-02-15 ENCOUNTER — ANESTHESIA (OUTPATIENT)
Dept: ENDOSCOPY | Facility: HOSPITAL | Age: 34
End: 2019-02-15
Payer: COMMERCIAL

## 2019-02-15 VITALS
HEART RATE: 70 BPM | RESPIRATION RATE: 16 BRPM | HEIGHT: 65 IN | BODY MASS INDEX: 26.66 KG/M2 | DIASTOLIC BLOOD PRESSURE: 69 MMHG | WEIGHT: 160 LBS | OXYGEN SATURATION: 97 % | TEMPERATURE: 98 F | SYSTOLIC BLOOD PRESSURE: 116 MMHG

## 2019-02-15 DIAGNOSIS — Z79.60 LONG-TERM USE OF IMMUNOSUPPRESSANT MEDICATION: ICD-10-CM

## 2019-02-15 DIAGNOSIS — K62.89 PROCTITIS: ICD-10-CM

## 2019-02-15 DIAGNOSIS — K51.00 ULCERATIVE PANCOLITIS: Primary | ICD-10-CM

## 2019-02-15 PROCEDURE — 88305 TISSUE EXAM BY PATHOLOGIST: CPT | Mod: 26,,, | Performed by: PATHOLOGY

## 2019-02-15 PROCEDURE — 88305 TISSUE EXAM BY PATHOLOGIST: CPT | Performed by: PATHOLOGY

## 2019-02-15 PROCEDURE — E9220 PRA ENDO ANESTHESIA: ICD-10-PCS | Mod: 33,,, | Performed by: NURSE ANESTHETIST, CERTIFIED REGISTERED

## 2019-02-15 PROCEDURE — 37000009 HC ANESTHESIA EA ADD 15 MINS: Performed by: INTERNAL MEDICINE

## 2019-02-15 PROCEDURE — 45380 COLONOSCOPY AND BIOPSY: CPT | Performed by: INTERNAL MEDICINE

## 2019-02-15 PROCEDURE — 25000003 PHARM REV CODE 250: Performed by: NURSE ANESTHETIST, CERTIFIED REGISTERED

## 2019-02-15 PROCEDURE — 63600175 PHARM REV CODE 636 W HCPCS: Performed by: NURSE ANESTHETIST, CERTIFIED REGISTERED

## 2019-02-15 PROCEDURE — E9220 PRA ENDO ANESTHESIA: HCPCS | Mod: 33,,, | Performed by: NURSE ANESTHETIST, CERTIFIED REGISTERED

## 2019-02-15 PROCEDURE — 45380 PR COLONOSCOPY,BIOPSY: ICD-10-PCS | Mod: 33,,, | Performed by: INTERNAL MEDICINE

## 2019-02-15 PROCEDURE — 37000008 HC ANESTHESIA 1ST 15 MINUTES: Performed by: INTERNAL MEDICINE

## 2019-02-15 PROCEDURE — 88305 TISSUE SPECIMEN TO PATHOLOGY - SURGERY: ICD-10-PCS | Mod: 26,,, | Performed by: PATHOLOGY

## 2019-02-15 PROCEDURE — 27201012 HC FORCEPS, HOT/COLD, DISP: Performed by: INTERNAL MEDICINE

## 2019-02-15 PROCEDURE — 45380 COLONOSCOPY AND BIOPSY: CPT | Mod: 33,,, | Performed by: INTERNAL MEDICINE

## 2019-02-15 RX ORDER — SODIUM CHLORIDE 9 MG/ML
INJECTION, SOLUTION INTRAVENOUS CONTINUOUS
Status: DISCONTINUED | OUTPATIENT
Start: 2019-02-15 | End: 2019-02-15 | Stop reason: HOSPADM

## 2019-02-15 RX ORDER — SODIUM CHLORIDE 9 MG/ML
INJECTION, SOLUTION INTRAVENOUS CONTINUOUS PRN
Status: DISCONTINUED | OUTPATIENT
Start: 2019-02-15 | End: 2019-02-15

## 2019-02-15 RX ORDER — PROPOFOL 10 MG/ML
VIAL (ML) INTRAVENOUS
Status: DISCONTINUED | OUTPATIENT
Start: 2019-02-15 | End: 2019-02-15

## 2019-02-15 RX ORDER — PROPOFOL 10 MG/ML
VIAL (ML) INTRAVENOUS CONTINUOUS PRN
Status: DISCONTINUED | OUTPATIENT
Start: 2019-02-15 | End: 2019-02-15

## 2019-02-15 RX ORDER — SODIUM CHLORIDE 0.9 % (FLUSH) 0.9 %
3 SYRINGE (ML) INJECTION
Status: DISCONTINUED | OUTPATIENT
Start: 2019-02-15 | End: 2019-02-15 | Stop reason: HOSPADM

## 2019-02-15 RX ORDER — LIDOCAINE HCL/PF 100 MG/5ML
SYRINGE (ML) INTRAVENOUS
Status: DISCONTINUED | OUTPATIENT
Start: 2019-02-15 | End: 2019-02-15

## 2019-02-15 RX ORDER — MESALAMINE 1000 MG/1
1000 SUPPOSITORY RECTAL NIGHTLY
Qty: 30 SUPPOSITORY | Refills: 1 | Status: SHIPPED | OUTPATIENT
Start: 2019-02-15 | End: 2019-03-17

## 2019-02-15 RX ADMIN — SODIUM CHLORIDE: 0.9 INJECTION, SOLUTION INTRAVENOUS at 07:02

## 2019-02-15 RX ADMIN — PROPOFOL 50 MG: 10 INJECTION, EMULSION INTRAVENOUS at 08:02

## 2019-02-15 RX ADMIN — SODIUM CHLORIDE: 0.9 INJECTION, SOLUTION INTRAVENOUS at 08:02

## 2019-02-15 RX ADMIN — LIDOCAINE HYDROCHLORIDE 50 MG: 20 INJECTION, SOLUTION INTRAVENOUS at 08:02

## 2019-02-15 RX ADMIN — PROPOFOL 150 MCG/KG/MIN: 10 INJECTION, EMULSION INTRAVENOUS at 08:02

## 2019-02-15 NOTE — ANESTHESIA POSTPROCEDURE EVALUATION
"Anesthesia Post Evaluation    Patient: Emily Pennington    Procedure(s) Performed: Procedure(s) (LRB):  COLONOSCOPY (N/A)    Final Anesthesia Type: general  Patient location during evaluation: PACU  Patient participation: Yes- Able to Participate  Level of consciousness: awake and alert and oriented  Post-procedure vital signs: reviewed and stable  Pain management: adequate  Airway patency: patent  PONV status at discharge: No PONV  Anesthetic complications: no      Cardiovascular status: blood pressure returned to baseline  Respiratory status: unassisted  Hydration status: euvolemic  Follow-up not needed.        Visit Vitals  BP 92/68 (BP Location: Left arm, Patient Position: Lying)   Pulse 63   Temp 36.5 °C (97.7 °F) (Tympanic)   Resp 16   Ht 5' 5" (1.651 m)   Wt 72.6 kg (160 lb)   SpO2 100%   Breastfeeding? Yes   BMI 26.63 kg/m²       Pain/Nikia Score: Nikia Score: 10 (2/15/2019  8:40 AM)        "

## 2019-02-15 NOTE — TRANSFER OF CARE
"Anesthesia Transfer of Care Note    Patient: Emily Pennington    Procedure(s) Performed: Procedure(s) (LRB):  COLONOSCOPY (N/A)    Patient location: PACU    Anesthesia Type: general    Transport from OR: Transported from OR on room air with adequate spontaneous ventilation    Post pain: adequate analgesia    Post assessment: no apparent anesthetic complications and tolerated procedure well    Post vital signs: stable    Level of consciousness: awake    Nausea/Vomiting: no nausea/vomiting    Complications: none    Transfer of care protocol was followed      Last vitals:   Visit Vitals  /68 (BP Location: Left arm, Patient Position: Lying)   Pulse 67   Temp 36.8 °C (98.3 °F) (Temporal)   Resp 18   Ht 5' 5" (1.651 m)   Wt 72.6 kg (160 lb)   SpO2 99%   Breastfeeding? Yes   BMI 26.63 kg/m²     "

## 2019-02-15 NOTE — DISCHARGE INSTRUCTIONS
Colonoscopy     A camera attached to a flexible tube with a viewing lens is used to take video pictures.     Colonoscopy is a test to view the inside of your lower digestive tract (colon and rectum). Sometimes it can show the last part of the small intestine (ileum). During the test, small pieces of tissue may be removed for testing. This is called a biopsy. Small growths, such as polyps, may also be removed.   Why is colonoscopy done?  The test is done to help look for colon cancer. And it can help find the source of abdominal pain, bleeding, and changes in bowel habits. It may be needed once a year, depending on factors such as your:  · Age  · Health history  · Family health history  · Symptoms  · Results from any prior colonoscopy  Risks and possible complications  These include:  · Bleeding               · A puncture or tear in the colon   · Risks of anesthesia  · A cancer lesion not being seen  Getting ready   To prepare for the test:  · Talk with your healthcare provider about the risks of the test (see below). Also ask your healthcare provider about alternatives to the test.  · Tell your healthcare provider about any medicines you take. Also tell him or her about any health conditions you may have.  · Make sure your rectum and colon are empty for the test. Follow the diet and bowel prep instructions exactly. If you dont, the test may need to be rescheduled.  · Plan for a friend or family member to drive you home after the test.     Colonoscopy provides an inside view of the entire colon.     You may discuss the results with your doctor right away or at a future visit.  During the test   The test is usually done in the hospital on an outpatient basis. This means you go home the same day. The procedure takes about 30 minutes. During that time:  · You are given relaxing (sedating) medicine through an IV line. You may be drowsy, or fully asleep.  · The healthcare provider will first give you a physical exam to  check for anal and rectal problems.  · Then the anus is lubricated and the scope inserted.  · If you are awake, you may have a feeling similar to needing to have a bowel movement. You may also feel pressure as air is pumped into the colon. Its OK to pass gas during the procedure.  · Biopsy, polyp removal, or other treatments may be done during the test.  After the test   You may have gas right after the test. It can help to try to pass it to help prevent later bloating. Your healthcare provider may discuss the results with you right away. Or you may need to schedule a follow-up visit to talk about the results. After the test, you can go back to your normal eating and other activities. You may be tired from the sedation and need to rest for a few hours.  Date Last Reviewed: 11/1/2016 © 2000-2017 [employer]. [address] [place] [address], [place] [address]. All rights reserved. This information is not intended as a substitute for professional medical care. Always follow your healthcare professional's instructions.

## 2019-02-15 NOTE — ANESTHESIA PREPROCEDURE EVALUATION
02/15/2019  Emily Pennington is a 33 y.o., female.    Past Surgical History:   Procedure Laterality Date    TONSILLECTOMY           Past Medical History:   Diagnosis Date    GERD (gastroesophageal reflux disease)     Ulcerative colitis     Vitamin B12 deficiency 1/22/2019       Anesthesia Evaluation         Review of Systems      Physical Exam  General:  Well nourished    Airway/Jaw/Neck:  Airway Findings: Mouth Opening: Normal Mallampati: I                 Anesthesia Plan  Type of Anesthesia, risks & benefits discussed:  Anesthesia Type:  general  Patient's Preference:   Intra-op Monitoring Plan:   Intra-op Monitoring Plan Comments:   Post Op Pain Control Plan:   Post Op Pain Control Plan Comments:   Induction:   IV  Beta Blocker:         Informed Consent: Patient understands risks and agrees with Anesthesia plan.  Questions answered.   ASA Score: 2     Day of Surgery Review of History & Physical: I have interviewed and examined the patient. I have reviewed the patient's H&P dated:  There are no significant changes.          Ready For Surgery From Anesthesia Perspective.

## 2019-02-15 NOTE — H&P
Short Stay Endoscopy History and Physical    PCP - Primary Doctor No  Referring Physician - Ryder Jameson MD  [address] [place] [address]    Procedure - Colonoscopy  ASA - per anesthesia  Mallampati - per anesthesia  History of Anesthesia problems - no  Family history Anesthesia problems -  no   Plan of anesthesia - General    HPI  33 y.o. female  Reason for procedure: surveillance, UC pancolitis      ROS:  Constitutional: No fevers, chills, No weight loss  CV: No chest pain  Pulm: No cough, No shortness of breath  GI: see HPI    Medical History:  has a past medical history of GERD (gastroesophageal reflux disease), Ulcerative colitis, and Vitamin B12 deficiency (1/22/2019).    Surgical History:  has a past surgical history that includes Tonsillectomy.    Family History: family history includes Inflammatory bowel disease in her maternal aunt; Skin cancer in her maternal aunt and maternal grandmother.. Otherwise no colon cancer, inflammatory bowel disease, or GI malignancies.    Social History:  reports that  has never smoked. she has never used smokeless tobacco. She reports that she drinks about 0.6 oz of alcohol per week. She reports that she does not use drugs.    Review of patient's allergies indicates:  No Known Allergies    Medications:   Medications Prior to Admission   Medication Sig Dispense Refill Last Dose    azaTHIOprine (IMURAN) 50 mg Tab TAKE 2 TABLETS BY MOUTH EVERY DAY 60 tablet 1 2/14/2019 at Unknown time    b complex vitamins tablet Take 1 tablet by mouth once daily.   2/14/2019 at Unknown time    golimumab (SIMPONI) 100 mg/mL PnIj Inject 100 mg into the skin every 28 days. 1 mL 5 Past Month at Unknown time    doxylamine succinate (UNISOM, DOXYLAMINE,) 25 mg tablet Take 25 mg by mouth nightly as needed for Insomnia.   Not Taking    mesalamine (LIALDA) 1.2 gram TbEC Take 4 tablets (4.8 g total) by mouth once daily. 120 tablet 11 Taking    prenatal vit/iron fum/folic ac  (PRENATAL 1+1 ORAL) Take 1 tablet by mouth once daily.   Taking       Physical Exam:    Vital Signs:   Vitals:    02/15/19 0753   BP: 114/68   Pulse: 67   Resp: 18   Temp: 98.3 °F (36.8 °C)       General Appearance: Well appearing in no acute distress  Abdomen: Soft, non tender, non distended with normal bowel sounds, no masses    Labs:  Lab Results   Component Value Date    WBC 10.00 10/01/2018    HGB 10.7 (L) 10/01/2018    HCT 32.9 (L) 10/01/2018     10/01/2018    ALT 11 10/01/2018    AST 16 10/01/2018     10/01/2018    K 3.6 10/01/2018     10/01/2018    CREATININE 0.6 10/01/2018    BUN 8 10/01/2018    CO2 23 10/01/2018    TSH 0.968 10/01/2018       I have explained the risks and benefits of this endoscopic procedure to the patient including but not limited to bleeding, inflammation, infection, perforation, and death.      Ryder Jameson MD

## 2019-02-15 NOTE — [employer] PATIENT INSTRUCTIONS
Discharge Summary/Instructions after an Endoscopic Procedure  Patient Name: Emily Matthew  Patient MRN: [id number]  Patient   YOB: 1985  Friday, February 15, 2019  Ryder Jameson MD  RESTRICTIONS:  During your procedure today, you received medications for sedation.  These   medications may affect your judgment, balance and coordination.  Therefore,   for 24 hours, you have the following restrictions:   - DO NOT drive a car, operate machinery, make legal/financial decisions,   sign important papers or drink alcohol.    ACTIVITY:  Today: no heavy lifting, straining or running due to procedural   sedation/anesthesia.  The following day: return to full activity including work.  DIET:  Eat and drink normally unless instructed otherwise.     TREATMENT FOR COMMON SIDE EFFECTS:  - Mild abdominal pain, nausea, belching, bloating or excessive gas:  rest,   eat lightly and use a heating pad.  - Sore Throat: treat with throat lozenges and/or gargle with warm salt   water.  - Because air was used during the procedure, expelling large amounts of air   from your rectum or belching is normal.  - If a bowel prep was taken, you may not have a bowel movement for 1-3 days.    This is normal.  SYMPTOMS TO WATCH FOR AND REPORT TO YOUR PHYSICIAN:  1. Abdominal pain or bloating, other than gas cramps.  2. Chest pain.  3. Back pain.  4. Signs of infection such as: chills or fever occurring within 24 hours   after the procedure.  5. Rectal bleeding, which would show as bright red, maroon, or black stools.   (A tablespoon of blood from the rectum is not serious, especially if   hemorrhoids are present.)  6. Vomiting.  7. Weakness or dizziness.  GO DIRECTLY TO THE NEAREST EMERGENCY ROOM IF YOU HAVE ANY OF THE FOLLOWING:      Difficulty breathing              Chills and/or fever over 101 F   Persistent vomiting and/or vomiting blood   Severe abdominal pain   Severe chest pain   Black, tarry stools   Bleeding- more than one  tablespoon   Any other symptom or condition that you feel may need urgent attention  Your doctor recommends these additional instructions:  If any biopsies were taken, your doctors clinic will contact you in 1 to 2   weeks with any results.  - Discharge patient to home.   - Patient has a contact number available for emergencies.  The signs and   symptoms of potential delayed complications were discussed with the   patient.  Return to normal activities tomorrow.  Written discharge   instructions were provided to the patient.   - Resume previous diet.   - Continue present medications.   - Await pathology results.   - Repeat colonoscopy in 1-2 years for surveillance.   - Start canasa suppositories MT qhs and give me update on any blood or   urgency if it was present before 2 weeks or if you have symptoms after 2   week course. Sent in suppositories to [facility]- you can pick it up   on your way out.    For questions, problems or results please call your physician - Ryder Jameson MD at Work:  [phone].  [facility], EMERGENCY ROOM PHONE NUMBER: [phone]  IF A COMPLICATION OR EMERGENCY SITUATION ARISES AND YOU ARE UNABLE TO REACH   YOUR PHYSICIAN - GO DIRECTLY TO THE EMERGENCY ROOM.  Ryder Jameson MD  2/15/2019 8:46:34 AM  This report has been verified and signed electronically.  [employer]

## 2019-02-22 ENCOUNTER — TELEPHONE (OUTPATIENT)
Dept: ENDOSCOPY | Facility: HOSPITAL | Age: 34
End: 2019-02-22

## 2019-02-26 ENCOUNTER — PATIENT MESSAGE (OUTPATIENT)
Dept: GASTROENTEROLOGY | Facility: CLINIC | Age: 34
End: 2019-02-26

## 2019-02-27 ENCOUNTER — PATIENT MESSAGE (OUTPATIENT)
Dept: GASTROENTEROLOGY | Facility: CLINIC | Age: 34
End: 2019-02-27

## 2019-03-01 ENCOUNTER — PATIENT MESSAGE (OUTPATIENT)
Dept: GASTROENTEROLOGY | Facility: CLINIC | Age: 34
End: 2019-03-01

## 2019-03-16 DIAGNOSIS — K51.00 ULCERATIVE PANCOLITIS: ICD-10-CM

## 2019-03-18 ENCOUNTER — PATIENT MESSAGE (OUTPATIENT)
Dept: GASTROENTEROLOGY | Facility: CLINIC | Age: 34
End: 2019-03-18

## 2019-03-18 DIAGNOSIS — K51.00 ULCERATIVE PANCOLITIS: ICD-10-CM

## 2019-03-18 DIAGNOSIS — Z79.60 LONG-TERM USE OF IMMUNOSUPPRESSANT MEDICATION: Primary | ICD-10-CM

## 2019-03-18 RX ORDER — AZATHIOPRINE 50 MG/1
100 TABLET ORAL DAILY
Qty: 60 TABLET | Refills: 0 | Status: SHIPPED | OUTPATIENT
Start: 2019-03-18 | End: 2019-04-13 | Stop reason: SDUPTHER

## 2019-03-26 ENCOUNTER — PATIENT MESSAGE (OUTPATIENT)
Dept: GASTROENTEROLOGY | Facility: CLINIC | Age: 34
End: 2019-03-26

## 2019-03-26 DIAGNOSIS — K51.00 ULCERATIVE PANCOLITIS: ICD-10-CM

## 2019-03-28 ENCOUNTER — PATIENT MESSAGE (OUTPATIENT)
Dept: GASTROENTEROLOGY | Facility: CLINIC | Age: 34
End: 2019-03-28

## 2019-03-29 ENCOUNTER — PATIENT MESSAGE (OUTPATIENT)
Dept: GASTROENTEROLOGY | Facility: CLINIC | Age: 34
End: 2019-03-29

## 2019-03-30 ENCOUNTER — PATIENT MESSAGE (OUTPATIENT)
Dept: GASTROENTEROLOGY | Facility: CLINIC | Age: 34
End: 2019-03-30

## 2019-03-30 LAB
ALBUMIN SERPL-MCNC: 5.1 G/DL (ref 3.5–5.5)
ALP SERPL-CCNC: 41 IU/L (ref 39–117)
ALT SERPL-CCNC: 19 IU/L (ref 0–32)
AST SERPL-CCNC: 17 IU/L (ref 0–40)
BASOPHILS # BLD AUTO: 0 X10E3/UL (ref 0–0.2)
BASOPHILS NFR BLD AUTO: 0 %
BILIRUB DIRECT SERPL-MCNC: 0.1 MG/DL (ref 0–0.4)
BILIRUB SERPL-MCNC: 0.3 MG/DL (ref 0–1.2)
EOSINOPHIL # BLD AUTO: 0.2 X10E3/UL (ref 0–0.4)
EOSINOPHIL NFR BLD AUTO: 4 %
ERYTHROCYTE [DISTWIDTH] IN BLOOD BY AUTOMATED COUNT: 14.9 % (ref 12.3–15.4)
HCT VFR BLD AUTO: 38.1 % (ref 34–46.6)
HGB BLD-MCNC: 12.8 G/DL (ref 11.1–15.9)
IMM GRANULOCYTES # BLD AUTO: 0 X10E3/UL (ref 0–0.1)
IMM GRANULOCYTES NFR BLD AUTO: 0 %
LYMPHOCYTES # BLD AUTO: 1.9 X10E3/UL (ref 0.7–3.1)
LYMPHOCYTES NFR BLD AUTO: 37 %
MCH RBC QN AUTO: 29.8 PG (ref 26.6–33)
MCHC RBC AUTO-ENTMCNC: 33.6 G/DL (ref 31.5–35.7)
MCV RBC AUTO: 89 FL (ref 79–97)
MONOCYTES # BLD AUTO: 0.3 X10E3/UL (ref 0.1–0.9)
MONOCYTES NFR BLD AUTO: 7 %
NEUTROPHILS # BLD AUTO: 2.6 X10E3/UL (ref 1.4–7)
NEUTROPHILS NFR BLD AUTO: 52 %
PLATELET # BLD AUTO: 286 X10E3/UL (ref 150–379)
PROT SERPL-MCNC: 7.6 G/DL (ref 6–8.5)
RBC # BLD AUTO: 4.3 X10E6/UL (ref 3.77–5.28)
WBC # BLD AUTO: 5.1 X10E3/UL (ref 3.4–10.8)

## 2019-04-03 ENCOUNTER — PATIENT MESSAGE (OUTPATIENT)
Dept: GASTROENTEROLOGY | Facility: CLINIC | Age: 34
End: 2019-04-03

## 2019-04-08 ENCOUNTER — PATIENT MESSAGE (OUTPATIENT)
Dept: GASTROENTEROLOGY | Facility: CLINIC | Age: 34
End: 2019-04-08

## 2019-04-13 DIAGNOSIS — K51.00 ULCERATIVE PANCOLITIS: Primary | ICD-10-CM

## 2019-04-14 ENCOUNTER — PATIENT MESSAGE (OUTPATIENT)
Dept: GASTROENTEROLOGY | Facility: CLINIC | Age: 34
End: 2019-04-14

## 2019-04-15 RX ORDER — AZATHIOPRINE 50 MG/1
100 TABLET ORAL DAILY
Qty: 60 TABLET | Refills: 2 | Status: SHIPPED | OUTPATIENT
Start: 2019-04-15 | End: 2019-05-08 | Stop reason: SDUPTHER

## 2019-04-15 NOTE — TELEPHONE ENCOUNTER
Pt returned my call and Maria Guadalupe transferred her to me  She just wanted to confirm a few things I mentioned in my message   Pt appreciated the quick reply

## 2019-04-15 NOTE — TELEPHONE ENCOUNTER
"Called pt, no answer, LM explaining I was calling in reference to her portal message.  I explained that generally the IBD related eye issues are very painful and very red.  I told her it could be a number of things and since she has a "germy toddler"  (as she put it) it would be best to be evaluated by an eye dr or urgent care.  I left my direct line with any questions   "

## 2019-05-08 ENCOUNTER — TELEPHONE (OUTPATIENT)
Dept: GASTROENTEROLOGY | Facility: CLINIC | Age: 34
End: 2019-05-08

## 2019-05-08 DIAGNOSIS — Z79.60 LONG-TERM USE OF IMMUNOSUPPRESSANT MEDICATION: ICD-10-CM

## 2019-05-08 DIAGNOSIS — K51.00 ULCERATIVE PANCOLITIS: Primary | ICD-10-CM

## 2019-05-08 RX ORDER — AZATHIOPRINE 50 MG/1
100 TABLET ORAL DAILY
Qty: 180 TABLET | Refills: 0 | Status: SHIPPED | OUTPATIENT
Start: 2019-05-08 | End: 2019-08-09 | Stop reason: SDUPTHER

## 2019-05-08 NOTE — TELEPHONE ENCOUNTER
----- Message from GEORGIE Ruelas sent at 5/8/2019  3:13 PM CDT -----  Please marcus patient and set up for CBC and LFTs this month    ThanksErin

## 2019-06-12 ENCOUNTER — PATIENT MESSAGE (OUTPATIENT)
Dept: GASTROENTEROLOGY | Facility: CLINIC | Age: 34
End: 2019-06-12

## 2019-06-24 ENCOUNTER — PATIENT MESSAGE (OUTPATIENT)
Dept: GASTROENTEROLOGY | Facility: CLINIC | Age: 34
End: 2019-06-24

## 2019-06-24 DIAGNOSIS — K51.00 ULCERATIVE PANCOLITIS: Primary | ICD-10-CM

## 2019-06-25 NOTE — PROGRESS NOTES
"     [facility]             Inflammatory Bowel Disease Follow-up  Note              TODAY'S VISIT DATE: 06/28/2019    Chief Complaint:   Chief Complaint   Patient presents with    Ulcerative Colitis     PCP: Primary Doctor No    Previous History:  Emily Pennington is a 34 y.o. 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  She developed symptoms of LLQ abdominal pain with mucous, rectal pain, and occasional blood mixed in her stool in approximately 2009.  She had a workup that included a colonoscopy that was normal except for some internal hemorrhoids and perhaps a "polyp" and negative celiac testing.  She continued with symptoms which became more frequent that was not relieved with bentyl or eliminating dairy or red meat.  Imodium did help some.  She developed worsening diarrhea (up to 10 BMs/day) that was soft with blood and a new rash (bilateral LE- small macular erythema and focal tenederness that advanced to raised plaque like lesions with pruritus and worsening lesion on dorsal foot with ankle pain) that prompted a hospitalization for 5 days.  While in the hospital, she developed LE purpura and edema with biopsies consistent with leukocytoclastic vasculitis with HSP.  She had a colonoscopy on 12/19/2013 that showed ulcerative pancolitis with normal TI however inability to fully r/o IgA vasculitis involving the colon.  For her skin lesions, she was given triamcinolone 0.1% cream and for her new onset diagnosis of UC she was started on IV solumedrol then transitioned to oral prednisone 40 mg PO daily.  She established care with Dr. Rolly Stephens at [facility] on 1/6/2014 st which time he tapered her prednisone and started her on lialda 4.8 gm/day then referred her to nephrology to ensure there was no kidney involvement of HSP.  Dr. Knight with nephrology at [facility] saw patient on 2/27/14 and remarked UA earlier that week showed 3+ " blood c/w microscopic hematuria (menstruating so false positive determined) and did not feel that a kidney biopsy was needed.  She continued monthly UAs and flex sig on 2014 was essentially normal with normal biopsies.  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  She had another course of prednisone and was started on imuran 100 mg PO daily and simponi in 2015.  By 2017 she had a clinic f/u with Dr. Stephens and was in clinic remission on simponi, lialda, and imuran.  He recommended a repeat flex sig in winter 3322-6651.  She became pregnant and had a vaginal delivery of a healthy term baby girl on 2018.  Her only delivery complication was a vaginal tear which slowly healed.  She was breastfeeding with no complications and continued on Simponi 100 mg every 4 weeks, Imuran 100 mg PO daily, and Lialda 4.8 gm/day.      Interval History:  - current IBD meds: Simponi 100 mg SC every 4 weeks (started 2015) last dose 2019, next dose 2019, Azathioprine 100 mg po daily (started 2015), Lialda 4.8 gm/day  - 1-3 formed Bowel Movements/day with no blood and no nocturnal bowel movements  - 2/15/2019 Colonoscopy: Mild distal proctitis with cecal patch, remainder of colon normal (path-periappendix: moderate chronic colitis with activity) (path-cecum, ascending, transverse, descending, sigmoid, proximal rectum, distal rectum: normal)  - started canasa 2/15/2019, completed 4 week course after colonoscopy   - breastfeeding going well  - NSAID use: No  - Narcotic use: No  - Alternative/complementary meds for IBD: No    Prior Pertinent Surgeries:  None    Pertinent Endoscopy/Imagin2013 skin biopsy: leukocytoclastic vasculitis with HSP- path- IgA deposits around vessels  2013 colonoscopy: inflammation characterized by  friability, granularity, loss of vascularity, confluent ulceration found in continuous and circumferential pattern from the rectum to the cecum, moderate in severity in the ascending and cecum (path-ascending, left colon: moderately active chronic colitis); normal TI up to 5 cm (path: normal)  9/23/2014 flex sig: no report (path-rectum, sigmoid: normal)  1/28/2015 flex sig: rectum was diffusely erythematous and friable; there was mucous noted and spontaneous hemorrhage (path-rectum: diffuse moderately active IBD; no abnormal immunoglobulin deposition)  2/15/2019 Colonoscopy: Mild distal proctitis with cecal patch, remainder of colon normal (path-periappendix: moderate chronic colitis with activity) (path-cecum, ascending, transverse, descending, sigmoid, proximal rectum, distal rectum: normal)    Pertinent Labs:  12/5/14 stool calprotectin 460  2/19/15 TPMT normal 16.4  Lab Results   Component Value Date    CRP 0.8 10/01/2018     Lab Results   Component Value Date    TTGIGA 2 10/01/2018    IGA 75 10/01/2018     Lab Results   Component Value Date    TSH 0.968 10/01/2018    FREET4 0.94 10/01/2018     Lab Results   Component Value Date    IELSQFFK48OS 31 10/01/2018    YUCBQRNM20 181 (L) 10/01/2018     Lab Results   Component Value Date    HEPBSAG Negative 10/01/2018    HEPBCAB Negative 10/01/2018     No results found for: EPO45JYRC  Lab Results   Component Value Date    NIL 0.030 10/01/2018    MITOGENNIL >10.000 10/01/2018     No results found for: TPTMINTERP, TPMTRESULT  No results found for: STOOLCULTURE, TIHWYMIDCO3M, PWXRGVSLII4B, CDIFFICILEAN, CDIFFTOX, CDIFFICILEBY  Lab Results   Component Value Date    CALPROTECTIN 26.6 10/01/2018     Therapeutic Drug Monitoring Labs:  3/26/15 6TG 130/6MMP <5700  4/29/15 6TG 210, 6MMP 6408  10/1/2018 6TG 98, 6MMP 5136    Prior IBD Meds:  Prednisone  canasa 1000 mg MO QHS (2/15/2019, took for 4 weeks)    Vaccinations:  Influenza (inactive): Fall 2018  Pneumococcal PCV 13:  recommended   Pneumococcal PCV 23: recommended   Tetanus (TdaP): 12/2018  HPV (males and females ages 19-25 yo): N/A     Meningococcal: UTD   Hepatitis B: Immune    Lab Results   Component Value Date    HEPBSAB Positive (A) 10/01/2018   Hepatitis A: Immune     Lab Results   Component Value Date    HEPAIGG Positive (A) 10/01/2018   MMR (live vaccine): UTD       Chickenpox status/Varicella (live vaccine): Immune  Lab Results   Component Value Date    VARICELLAZOS 3.79 (H) 10/01/2018    VARICELLAINT Positive (A) 10/01/2018   Zoster (age >51 yo, live vaccine): Shingrix series     Review of Systems   Constitutional: Negative for chills, fever and weight loss.   HENT:        No oral ulcers, dysphagia, oral thrush   Eyes: Negative for blurred vision, pain and redness.   Respiratory: Negative for cough and shortness of breath.    Cardiovascular: Negative for chest pain.   Gastrointestinal: Negative for abdominal pain, heartburn, nausea and vomiting.   Genitourinary: Negative for dysuria and hematuria.   Musculoskeletal: Negative for back pain and joint pain.   Skin: Negative for rash.   Psychiatric/Behavioral: Negative for depression. The patient is not nervous/anxious and does not have insomnia.      All Medical History/Surgical History/Family History/Social History/Allergies have been reviewed and updated in EMR    Review of patient's allergies indicates:  No Known Allergies      Outpatient Medications Marked as Taking for the 6/28/19 encounter (Office Visit) with GEORGIE Ruelas   Medication Sig Dispense Refill    azaTHIOprine (IMURAN) 50 mg Tab Take 2 tablets (100 mg total) by mouth once daily. 180 tablet 0    golimumab (SIMPONI) 100 mg/mL PnIj Inject 100 mg into the skin every 28 days. 1 mL 5    mesalamine (LIALDA) 1.2 gram TbEC Take 4 tablets (4.8 g total) by mouth once daily. 120 tablet 11    norethindrone (BUDDY) 0.35 mg tablet Take 1 tablet by mouth once daily.      prenatal vit/iron fum/folic ac (PRENATAL  "1+1 ORAL) Take 1 tablet by mouth once daily.       Vital Signs:  Vitals:    06/28/19 0810   BP: 112/75   Pulse: 71   Resp: 16   Temp: 98.3 °F (36.8 °C)   SpO2: 100%   Weight: 73.5 kg (162 lb 0.6 oz)   Height: 5' 5" (1.651 m)   PainSc: 0-No pain     Physical Exam   Constitutional: She is oriented to person, place, and time. She appears well-developed.   HENT:   Mouth/Throat: Oropharynx is clear and moist. No oral lesions.   No oral ulcers or thrush   Eyes: Pupils are equal, round, and reactive to light. Conjunctivae are normal.   Cardiovascular: Normal rate and regular rhythm.   Pulmonary/Chest: Effort normal and breath sounds normal.   Abdominal: Soft. There is no tenderness.   Musculoskeletal:        Right lower leg: She exhibits no swelling.        Left lower leg: She exhibits no swelling.   Neurological: She is alert and oriented to person, place, and time.   Skin: No rash noted.   Psychiatric: She has a normal mood and affect.   Nursing note and vitals reviewed.    Labs:   Lab Results   Component Value Date    WBC 5.1 03/29/2019    HGB 12.8 03/29/2019    HCT 38.1 03/29/2019    MCV 89 03/29/2019     03/29/2019     Lab Results   Component Value Date    CREATININE 0.6 10/01/2018    ALBUMIN 5.1 03/29/2019    BILITOT 0.3 03/29/2019    ALKPHOS 41 03/29/2019    AST 17 03/29/2019    ALT 19 03/29/2019     Lab Results   Component Value Date    NIL 0.030 10/01/2018    MITOGENNIL >10.000 10/01/2018     Assessment/Plan:  Emily Pennington is a 34 y.o. female with ulcerative pancolitis.  She continues to do well post-partum on simponi 100 mg every 4 weeks, imuran 50 mg PO daily, and lialda 4.8 gm/day.  Colonoscopy on 2/15/2019 showed some mild proctitis with a cecal patch with the remainder of the colon normal and biopsies showed moderate colitis in the periappendix with the remainder normal.  She completed a 4 week course of canasa nightly after the colonoscopy.  We will plan for a repeat colonoscopy in 2/2020 to " restage her disease.  She had routine labs drawn today and we discussed health maintenance as well as vaccinations.  She would like to wait on the remainder of her vaccinations until she has stopped breastfeeding.  If simponi proves to be ineffective, she is naive to remicade, humira, entyvio, and xeljanz.      # Ulcerative pancolitis/postpartum:  - continue simponi 100 mg SC every 4 weeks, due 7/7/2019  - continue imuran 100 mg po daily  - continue lialda 4.8 g/day  - 10/1/2018: stool calprotectin 26.8 normal  - repeat colonoscopy due 2/2020  - CRC Risk: pancolitis with symptom onset 2009, surveillance every 1-2 years   - drug monitoring labs: CBC/LFTs every 3 mos (likely will get at [facility] or [facility] depending on cost) due 9/2019; UA/Cr due 10/2019; TPMT (normal 16.4, 2/2015), TB quantiferon (negative 10/2018, repeat yearly or sooner if RFs), Hep B testing (HBsAg, HBtotalcoreAb negative 10/2018, repeat yearly)    # Vitamin B12 deficiency:   - continue vitamin B12 1000 mcg SL daily  - repeat vit B12 in 6 mos due with next labs     # IBD specific health maintenance:  Pap smear due 8/2019  Opthamologic exam due Spring 2020  Dermatologic exam due 5/2020  Repeat vitamin D level due 10/2019  Lab Results   Component Value Date    SIJSEZFZ80KV 31 10/01/2018   Flu shot yearly  Tetanus every 10 years  Pneumonia 13 & 23 vaccines   Shingrix series     Follow up: with Dr. Jameson in 6 months     Total visit time was 25 minutes, more than 50% of which was spent in face-to-face counseling with patient regarding evaluation and management goals and treatment options for Ulcerative colitis     Erin Cruz, EVAP-C  Department of Gastroenterology  Inflammatory Bowel Disease Program

## 2019-06-28 ENCOUNTER — PATIENT MESSAGE (OUTPATIENT)
Dept: GASTROENTEROLOGY | Facility: CLINIC | Age: 34
End: 2019-06-28

## 2019-06-28 ENCOUNTER — OFFICE VISIT (OUTPATIENT)
Dept: GASTROENTEROLOGY | Facility: CLINIC | Age: 34
End: 2019-06-28
Payer: COMMERCIAL

## 2019-06-28 VITALS
OXYGEN SATURATION: 100 % | TEMPERATURE: 98 F | DIASTOLIC BLOOD PRESSURE: 75 MMHG | WEIGHT: 162.06 LBS | RESPIRATION RATE: 16 BRPM | SYSTOLIC BLOOD PRESSURE: 112 MMHG | BODY MASS INDEX: 27 KG/M2 | HEIGHT: 65 IN | HEART RATE: 71 BPM

## 2019-06-28 DIAGNOSIS — K51.00 ULCERATIVE PANCOLITIS: Primary | ICD-10-CM

## 2019-06-28 DIAGNOSIS — E53.8 VITAMIN B12 DEFICIENCY: ICD-10-CM

## 2019-06-28 DIAGNOSIS — Z79.899 HIGH RISK MEDICATION USE: ICD-10-CM

## 2019-06-28 PROCEDURE — 99999 PR PBB SHADOW E&M-EST. PATIENT-LVL V: ICD-10-PCS | Mod: PBBFAC,,, | Performed by: NURSE PRACTITIONER

## 2019-06-28 PROCEDURE — 99999 PR PBB SHADOW E&M-EST. PATIENT-LVL V: CPT | Mod: PBBFAC,,, | Performed by: NURSE PRACTITIONER

## 2019-06-28 PROCEDURE — 99214 PR OFFICE/OUTPT VISIT, EST, LEVL IV, 30-39 MIN: ICD-10-PCS | Mod: S$GLB,,, | Performed by: NURSE PRACTITIONER

## 2019-06-28 PROCEDURE — 99214 OFFICE O/P EST MOD 30 MIN: CPT | Mod: S$GLB,,, | Performed by: NURSE PRACTITIONER

## 2019-06-28 RX ORDER — ACETAMINOPHEN AND CODEINE PHOSPHATE 120; 12 MG/5ML; MG/5ML
1 SOLUTION ORAL DAILY
COMMUNITY
End: 2020-10-07

## 2019-06-28 NOTE — PATIENT INSTRUCTIONS
Instructions:  - continue simponi 100 mg SC every 4 weeks, due 7/7/2019  - continue imuran 100 mg po daily  - continue Lialda 4.8 gm/day  - continue vitamin B12 1000 mcg SL daily  - next labs due 9/2019   - repeat colonoscopy due 2/2020  - Avoid all NSAIDs (Advil, Ibuprofen, Motrin, Aspirin, Naprosyn, Aleve)  - Yearly Pap Smears due 7/2019  - Yearly Eye exam due Spring 2020  - Yearly Skin exam--wear sun block and hats due 5/2020  - Use antibiotics with caution  - For Dental Procedures, use antibiotics only if absolutely necessary  - If you have to take antibiotics for any infection, please take a 2 week course of OTC Florastor 1 capsule twice daily probiotic after completion of the antibiotic course  - If you are on any immunosuppressive medications (for example: remicade, humira, imuran, entyvio...), you should refrain from eating any raw seafood  - Vaccines recommended, OK to wait until after breastfeeding  Flu shot yearly  Tetanus every 10 years  Pneumonia 13 (PCV13) vaccine initial  Pneumonia 23 (PPSV23) vaccine 1 year after PCV13, then an additional dose in 5 years, then again at age 65  Shingrix series  - Follow up with Dr. Jameson in 6 months

## 2019-06-29 LAB
ALBUMIN SERPL-MCNC: 5 G/DL (ref 3.5–5.5)
ALP SERPL-CCNC: 52 IU/L (ref 39–117)
ALT SERPL-CCNC: 16 IU/L (ref 0–32)
AST SERPL-CCNC: 17 IU/L (ref 0–40)
BASOPHILS # BLD AUTO: 0 X10E3/UL (ref 0–0.2)
BASOPHILS NFR BLD AUTO: 0 %
BILIRUB DIRECT SERPL-MCNC: 0.14 MG/DL (ref 0–0.4)
BILIRUB SERPL-MCNC: 0.4 MG/DL (ref 0–1.2)
EOSINOPHIL # BLD AUTO: 0.2 X10E3/UL (ref 0–0.4)
EOSINOPHIL NFR BLD AUTO: 6 %
ERYTHROCYTE [DISTWIDTH] IN BLOOD BY AUTOMATED COUNT: 14.6 % (ref 12.3–15.4)
HCT VFR BLD AUTO: 39.5 % (ref 34–46.6)
HGB BLD-MCNC: 12.9 G/DL (ref 11.1–15.9)
IMM GRANULOCYTES # BLD AUTO: 0 X10E3/UL (ref 0–0.1)
IMM GRANULOCYTES NFR BLD AUTO: 0 %
LYMPHOCYTES # BLD AUTO: 1.4 X10E3/UL (ref 0.7–3.1)
LYMPHOCYTES NFR BLD AUTO: 34 %
MCH RBC QN AUTO: 30.1 PG (ref 26.6–33)
MCHC RBC AUTO-ENTMCNC: 32.7 G/DL (ref 31.5–35.7)
MCV RBC AUTO: 92 FL (ref 79–97)
MONOCYTES # BLD AUTO: 0.4 X10E3/UL (ref 0.1–0.9)
MONOCYTES NFR BLD AUTO: 10 %
NEUTROPHILS # BLD AUTO: 2 X10E3/UL (ref 1.4–7)
NEUTROPHILS NFR BLD AUTO: 50 %
PLATELET # BLD AUTO: 246 X10E3/UL (ref 150–450)
PROT SERPL-MCNC: 7.5 G/DL (ref 6–8.5)
RBC # BLD AUTO: 4.29 X10E6/UL (ref 3.77–5.28)
WBC # BLD AUTO: 4 X10E3/UL (ref 3.4–10.8)

## 2019-07-01 ENCOUNTER — PATIENT MESSAGE (OUTPATIENT)
Dept: GASTROENTEROLOGY | Facility: CLINIC | Age: 34
End: 2019-07-01

## 2019-08-07 DIAGNOSIS — K51.00 ULCERATIVE PANCOLITIS: ICD-10-CM

## 2019-08-07 NOTE — TELEPHONE ENCOUNTER
Received refill request from [facility] for pts Azathioprine   Last filled on 07/08 for a 3 mth supply    Called pt, no answer, unable to leave message as VM is full.  Will send pt a portal message

## 2019-08-09 NOTE — TELEPHONE ENCOUNTER
Called and spoke with pt  I explained we received a refill request for her Imuran and I just wanted to confirm that she did not receive a 3 mth supply  She stated No she did not  She gets a moth at a time    I explained I will submit the refill but only for a mth as she is due for labs in Sept.  Once those are done then we can send in a 3 mth supply  Pt expressed understanding and appreciated the call     Allergies reviewed   Script pended for approval    Labs due in Sept and are already ordered for [facility]

## 2019-08-11 ENCOUNTER — PATIENT MESSAGE (OUTPATIENT)
Dept: GASTROENTEROLOGY | Facility: CLINIC | Age: 34
End: 2019-08-11

## 2019-08-11 DIAGNOSIS — Z79.899 HIGH RISK MEDICATION USE: ICD-10-CM

## 2019-08-11 DIAGNOSIS — K51.00 ULCERATIVE PANCOLITIS: Primary | ICD-10-CM

## 2019-08-11 RX ORDER — AZATHIOPRINE 50 MG/1
100 TABLET ORAL DAILY
Qty: 60 TABLET | Refills: 0 | Status: SHIPPED | OUTPATIENT
Start: 2019-08-11 | End: 2019-10-02 | Stop reason: SDUPTHER

## 2019-09-03 DIAGNOSIS — K51.00 ULCERATIVE PANCOLITIS: ICD-10-CM

## 2019-09-03 RX ORDER — GOLIMUMAB 100 MG/ML
INJECTION, SOLUTION SUBCUTANEOUS
Qty: 1 ML | Refills: 5 | Status: SHIPPED | OUTPATIENT
Start: 2019-09-03 | End: 2020-01-28 | Stop reason: SDUPTHER

## 2019-09-05 ENCOUNTER — TELEPHONE (OUTPATIENT)
Dept: GASTROENTEROLOGY | Facility: CLINIC | Age: 34
End: 2019-09-05

## 2019-09-05 NOTE — TELEPHONE ENCOUNTER
Called and spoke with Mika  I explained we can give her a 1 mth supply and once she gets her labs at the end of this month then we can do a 3 mth supply  She stated she will remind pt to go to [facility] for her labs

## 2019-09-05 NOTE — TELEPHONE ENCOUNTER
----- Message from Ellen Wells sent at 9/5/2019  1:16 PM CDT -----  Contact: Mika ([facility]): [phone]  Called to obtain a refill on AZA, please send to pharmacy below       [facility] [address] [address] [employer] [address] - [facility] [phone] (Phone)  [phone] (Fax)

## 2019-10-02 DIAGNOSIS — K51.00 ULCERATIVE PANCOLITIS: ICD-10-CM

## 2019-10-02 DIAGNOSIS — K51.00 ULCERATIVE PANCOLITIS: Primary | ICD-10-CM

## 2019-10-02 RX ORDER — AZATHIOPRINE 50 MG/1
100 TABLET ORAL DAILY
Qty: 60 TABLET | Refills: 0 | Status: SHIPPED | OUTPATIENT
Start: 2019-10-02 | End: 2019-11-12 | Stop reason: SDUPTHER

## 2019-10-04 ENCOUNTER — TELEPHONE (OUTPATIENT)
Dept: GASTROENTEROLOGY | Facility: CLINIC | Age: 34
End: 2019-10-04

## 2019-10-04 NOTE — TELEPHONE ENCOUNTER
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

## 2019-10-06 DIAGNOSIS — K51.00 ULCERATIVE PANCOLITIS: ICD-10-CM

## 2019-10-07 RX ORDER — MESALAMINE 1.2 G/1
TABLET, DELAYED RELEASE ORAL
Qty: 120 TABLET | Refills: 11 | Status: SHIPPED | OUTPATIENT
Start: 2019-10-07 | End: 2020-10-02

## 2019-10-07 NOTE — TELEPHONE ENCOUNTER
Called pt, no answer, LM explaining after speaking with DR Jameson she is OK with making her 12/04 appoint a video visit  I left my direct line and asked pt to either call or send me a portal message to let me know if she is OK with switching it to a video visit or if she wants to r/s all together

## 2019-10-07 NOTE — TELEPHONE ENCOUNTER
----- Message from Ryder Jameson MD sent at 10/7/2019 12:06 PM CDT -----  Yes, video visit fine  ----- Message -----  From: Greta Glover MA  Sent: 10/4/2019   4:44 PM CDT  To: Gisell Soler Staff    That works   SS   Routing comment     You  Ryder Jameson MD 41 minutes ago (4:03 PM)     Dr Jameson can we make this pts follow up a video visit for 12/04/2019?

## 2019-10-23 ENCOUNTER — PATIENT MESSAGE (OUTPATIENT)
Dept: GASTROENTEROLOGY | Facility: CLINIC | Age: 34
End: 2019-10-23

## 2019-11-01 ENCOUNTER — PATIENT MESSAGE (OUTPATIENT)
Dept: GASTROENTEROLOGY | Facility: CLINIC | Age: 34
End: 2019-11-01

## 2019-11-01 ENCOUNTER — TELEPHONE (OUTPATIENT)
Dept: GASTROENTEROLOGY | Facility: CLINIC | Age: 34
End: 2019-11-01

## 2019-11-07 ENCOUNTER — TELEPHONE (OUTPATIENT)
Dept: GASTROENTEROLOGY | Facility: CLINIC | Age: 34
End: 2019-11-07

## 2019-11-07 NOTE — TELEPHONE ENCOUNTER
Received refill request from [facility] for a 90 day supply for pts Imuran  Last Labs  She stated she had just had them drawn when she spoke with Dr Jameson.  I checked chart and pt went to [facility]  Will call in the morning to get the results

## 2019-11-08 LAB
25(OH)D3+25(OH)D2 SERPL-MCNC: 24.2 NG/ML (ref 30–100)
ALBUMIN SERPL-MCNC: 5.1 G/DL (ref 3.5–5.5)
ALP SERPL-CCNC: 48 IU/L (ref 39–117)
ALT SERPL-CCNC: 12 IU/L (ref 0–32)
AST SERPL-CCNC: 17 IU/L (ref 0–40)
BASOPHILS # BLD AUTO: 0 X10E3/UL (ref 0–0.2)
BASOPHILS NFR BLD AUTO: 1 %
BILIRUB DIRECT SERPL-MCNC: 0.1 MG/DL (ref 0–0.4)
BILIRUB SERPL-MCNC: 0.2 MG/DL (ref 0–1.2)
EOSINOPHIL # BLD AUTO: 0.2 X10E3/UL (ref 0–0.4)
EOSINOPHIL NFR BLD AUTO: 4 %
ERYTHROCYTE [DISTWIDTH] IN BLOOD BY AUTOMATED COUNT: 15.1 % (ref 12.3–15.4)
GAMMA INTERFERON BACKGROUND BLD IA-ACNC: 0.06 IU/ML
HBV CORE AB SERPL QL IA: NEGATIVE
HBV SURFACE AG SERPL QL IA: NEGATIVE
HCT VFR BLD AUTO: 38.6 % (ref 34–46.6)
HGB BLD-MCNC: 12.6 G/DL (ref 11.1–15.9)
IMM GRANULOCYTES # BLD AUTO: 0 X10E3/UL (ref 0–0.1)
IMM GRANULOCYTES NFR BLD AUTO: 0 %
LYMPHOCYTES # BLD AUTO: 2.4 X10E3/UL (ref 0.7–3.1)
LYMPHOCYTES NFR BLD AUTO: 44 %
M TB IFN-G BLD-IMP: NEGATIVE
M TB IFN-G CD4+ BCKGRND COR BLD-ACNC: 0.07 IU/ML
MCH RBC QN AUTO: 30.2 PG (ref 26.6–33)
MCHC RBC AUTO-ENTMCNC: 32.6 G/DL (ref 31.5–35.7)
MCV RBC AUTO: 93 FL (ref 79–97)
MITOGEN IGNF BLD-ACNC: >10 IU/ML
MONOCYTES # BLD AUTO: 0.4 X10E3/UL (ref 0.1–0.9)
MONOCYTES NFR BLD AUTO: 7 %
NEUTROPHILS # BLD AUTO: 2.3 X10E3/UL (ref 1.4–7)
NEUTROPHILS NFR BLD AUTO: 44 %
PLATELET # BLD AUTO: 305 X10E3/UL (ref 150–450)
PROT SERPL-MCNC: 7.9 G/DL (ref 6–8.5)
QUANTIFERON TB GOLD (INCUBATED): NORMAL
QUANTIFERON TB2 AG VALUE: 0.09 IU/ML
RBC # BLD AUTO: 4.17 X10E6/UL (ref 3.77–5.28)
SERVICE CMNT-IMP: NORMAL
VIT B12 SERPL-MCNC: 676 PG/ML (ref 232–1245)
WBC # BLD AUTO: 5.3 X10E3/UL (ref 3.4–10.8)

## 2019-11-11 ENCOUNTER — PATIENT MESSAGE (OUTPATIENT)
Dept: GASTROENTEROLOGY | Facility: CLINIC | Age: 34
End: 2019-11-11

## 2019-11-12 ENCOUNTER — PATIENT MESSAGE (OUTPATIENT)
Dept: GASTROENTEROLOGY | Facility: CLINIC | Age: 34
End: 2019-11-12

## 2019-11-12 DIAGNOSIS — K51.00 ULCERATIVE PANCOLITIS: ICD-10-CM

## 2019-11-12 RX ORDER — AZATHIOPRINE 50 MG/1
100 TABLET ORAL DAILY
Qty: 180 TABLET | Refills: 0 | Status: SHIPPED | OUTPATIENT
Start: 2019-11-12 | End: 2020-02-09

## 2019-11-12 NOTE — TELEPHONE ENCOUNTER
Received refill request from pt for pts Imuran Rx.    Allergies reviewed    Last labs: 11/2019    Next appt: 12/4/19    RX pended for approval

## 2019-11-12 NOTE — TELEPHONE ENCOUNTER
Outside Labs:    [facility]    Date of labs:   11/04/2019    CBC Results:    WBC   5.3  RBC   4.17  HEMOGLOBIN   12.6  HEMATOCRIT   38.6  MCV   93  PLATELETS   305      HEPATIC FUNCTION PANEL Results:    PROTEIN, TOTAL, SERUM   7.9  BILIRUBIN, TOTAL   0.2  BILIRUBIN, DIRECT   0.10  ALKALINE PHOSPHATASE   48  AST (SGOT)   17  ALT (SGPT)   12    VITAMIN B12     676    HBsAg SCREEN   NEGATIVE    HEP B CORE AB, TOT   NEGATIVE           NEXT SCHEDULED APPOINTMENT:   12/04/2019 @ 10:20  KATHLEEN VAZQUEZ PENDED FOR APPROVAL  ALLERGIES REVIEWED

## 2019-12-04 ENCOUNTER — TELEPHONE (OUTPATIENT)
Dept: GASTROENTEROLOGY | Facility: CLINIC | Age: 34
End: 2019-12-04

## 2019-12-04 ENCOUNTER — OFFICE VISIT (OUTPATIENT)
Dept: GASTROENTEROLOGY | Facility: CLINIC | Age: 34
End: 2019-12-04
Payer: COMMERCIAL

## 2019-12-04 DIAGNOSIS — D84.9 IMMUNOSUPPRESSED STATUS: ICD-10-CM

## 2019-12-04 DIAGNOSIS — K51.00 ULCERATIVE PANCOLITIS: Primary | ICD-10-CM

## 2019-12-04 DIAGNOSIS — Z01.00 ROUTINE EYE EXAM: ICD-10-CM

## 2019-12-04 DIAGNOSIS — E53.8 VITAMIN B12 DEFICIENCY: ICD-10-CM

## 2019-12-04 PROCEDURE — 99215 OFFICE O/P EST HI 40 MIN: CPT | Mod: 95,,, | Performed by: INTERNAL MEDICINE

## 2019-12-04 PROCEDURE — 99215 PR OFFICE/OUTPT VISIT, EST, LEVL V, 40-54 MIN: ICD-10-PCS | Mod: 95,,, | Performed by: INTERNAL MEDICINE

## 2019-12-04 NOTE — TELEPHONE ENCOUNTER
Called pt, no answer, unable to LM as VM is full.    Need to get something's scheduled from her Video Visit   - referral to optometry  - labs first week of 2/2020- orders for CBC/LFTs placed- remind pt  - colonoscopy timing to be decided  - start vit B12 1000 mcg SL daily  - start vit D 1000 IU/day in addition to prenatal vitamins  - follow up in 6 months- video visit.      Printed and mailed orders for Simponi levels and ab's and wrote date due on top  12/23    Portal message sent to pt
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
## 2019-12-04 NOTE — PATIENT INSTRUCTIONS
Instructions:  - referral to optometry  - labs first week of 2/2020- orders for CBC/LFTs placed- remind pt  - simponi levels/abs at [facility] on 12/23/19- please mail labslip to patient  - colonoscopy timing to be decided  - start vit B12 1000 mcg SL daily  - start vit D 1000 IU/day in addition to prenatal vitamins  - follow up in 6 months- video visit

## 2020-01-01 LAB
GOLIMUMAB AB SERPL IA-MCNC: <20 NG/ML
GOLIMUMAB SERPL IA-MCNC: 1.1 UG/ML

## 2020-01-07 ENCOUNTER — PATIENT MESSAGE (OUTPATIENT)
Dept: GASTROENTEROLOGY | Facility: CLINIC | Age: 35
End: 2020-01-07

## 2020-01-28 DIAGNOSIS — K51.00 ULCERATIVE PANCOLITIS: ICD-10-CM

## 2020-01-28 NOTE — TELEPHONE ENCOUNTER
Received refill request from [employer] for pts Simpon Rx.    Allergies reviewed    Last labs: 12/23/2019    Due for Labs: 2/2020     Next appt: 7/15/2020    RX pended for approval      -video visit would not work with pt's schedule   -need late Wednesday Morning appt

## 2020-02-09 ENCOUNTER — PATIENT MESSAGE (OUTPATIENT)
Dept: GASTROENTEROLOGY | Facility: CLINIC | Age: 35
End: 2020-02-09

## 2020-02-09 DIAGNOSIS — K51.00 ULCERATIVE PANCOLITIS: ICD-10-CM

## 2020-02-09 RX ORDER — AZATHIOPRINE 50 MG/1
100 TABLET ORAL DAILY
Qty: 60 TABLET | Refills: 0 | Status: SHIPPED | OUTPATIENT
Start: 2020-02-09 | End: 2020-03-05

## 2020-02-26 ENCOUNTER — PATIENT MESSAGE (OUTPATIENT)
Dept: GASTROENTEROLOGY | Facility: CLINIC | Age: 35
End: 2020-02-26

## 2020-02-26 NOTE — TELEPHONE ENCOUNTER
Called [facility] pharmacy and spoke with Arnie  He stated that he will let the insurance company know we have not received request for PA   I gave our direct fax line

## 2020-02-27 NOTE — TELEPHONE ENCOUNTER
Called and left message informing patient we did not receive PA information.   I will contact her insurance tomorrow to have PA information fax to us.   Ask patient to send message back when her next simponi dose is due

## 2020-02-28 NOTE — TELEPHONE ENCOUNTER
called and spoke to Soraya at [facility] pt's medication was approved.   [facility] can be [facility].   Portal message sent to pt

## 2020-03-05 ENCOUNTER — PATIENT MESSAGE (OUTPATIENT)
Dept: GASTROENTEROLOGY | Facility: CLINIC | Age: 35
End: 2020-03-05

## 2020-03-05 DIAGNOSIS — K51.00 ULCERATIVE PANCOLITIS: ICD-10-CM

## 2020-03-05 RX ORDER — AZATHIOPRINE 50 MG/1
100 TABLET ORAL DAILY
Qty: 60 TABLET | Refills: 0 | Status: SHIPPED | OUTPATIENT
Start: 2020-03-05 | End: 2020-03-14 | Stop reason: SDUPTHER

## 2020-03-11 ENCOUNTER — PATIENT MESSAGE (OUTPATIENT)
Dept: GASTROENTEROLOGY | Facility: CLINIC | Age: 35
End: 2020-03-11

## 2020-03-14 DIAGNOSIS — K51.00 ULCERATIVE PANCOLITIS: ICD-10-CM

## 2020-03-14 LAB
ALBUMIN SERPL-MCNC: 4.9 G/DL (ref 3.8–4.8)
ALP SERPL-CCNC: 50 IU/L (ref 39–117)
ALT SERPL-CCNC: 11 IU/L (ref 0–32)
AST SERPL-CCNC: 14 IU/L (ref 0–40)
BASOPHILS # BLD AUTO: 0 X10E3/UL (ref 0–0.2)
BASOPHILS NFR BLD AUTO: 0 %
BILIRUB DIRECT SERPL-MCNC: 0.13 MG/DL (ref 0–0.4)
BILIRUB SERPL-MCNC: 0.5 MG/DL (ref 0–1.2)
EOSINOPHIL # BLD AUTO: 0.1 X10E3/UL (ref 0–0.4)
EOSINOPHIL NFR BLD AUTO: 3 %
ERYTHROCYTE [DISTWIDTH] IN BLOOD BY AUTOMATED COUNT: 14.7 % (ref 11.7–15.4)
HCT VFR BLD AUTO: 37.9 % (ref 34–46.6)
HGB BLD-MCNC: 12.1 G/DL (ref 11.1–15.9)
IMM GRANULOCYTES # BLD AUTO: 0 X10E3/UL (ref 0–0.1)
IMM GRANULOCYTES NFR BLD AUTO: 0 %
LYMPHOCYTES # BLD AUTO: 1.9 X10E3/UL (ref 0.7–3.1)
LYMPHOCYTES NFR BLD AUTO: 38 %
MCH RBC QN AUTO: 29.5 PG (ref 26.6–33)
MCHC RBC AUTO-ENTMCNC: 31.9 G/DL (ref 31.5–35.7)
MCV RBC AUTO: 92 FL (ref 79–97)
MONOCYTES # BLD AUTO: 0.3 X10E3/UL (ref 0.1–0.9)
MONOCYTES NFR BLD AUTO: 6 %
NEUTROPHILS # BLD AUTO: 2.7 X10E3/UL (ref 1.4–7)
NEUTROPHILS NFR BLD AUTO: 53 %
PLATELET # BLD AUTO: 328 X10E3/UL (ref 150–450)
PROT SERPL-MCNC: 7.3 G/DL (ref 6–8.5)
RBC # BLD AUTO: 4.1 X10E6/UL (ref 3.77–5.28)
WBC # BLD AUTO: 5.1 X10E3/UL (ref 3.4–10.8)

## 2020-03-14 RX ORDER — AZATHIOPRINE 50 MG/1
100 TABLET ORAL DAILY
Qty: 180 TABLET | Refills: 0 | Status: SHIPPED | OUTPATIENT
Start: 2020-03-14 | End: 2020-04-01

## 2020-03-31 ENCOUNTER — PATIENT MESSAGE (OUTPATIENT)
Dept: GASTROENTEROLOGY | Facility: CLINIC | Age: 35
End: 2020-03-31

## 2020-04-01 DIAGNOSIS — K51.00 ULCERATIVE PANCOLITIS: ICD-10-CM

## 2020-04-01 RX ORDER — AZATHIOPRINE 50 MG/1
100 TABLET ORAL DAILY
Qty: 180 TABLET | Refills: 0 | Status: SHIPPED | OUTPATIENT
Start: 2020-04-01 | End: 2020-07-02

## 2020-04-02 ENCOUNTER — PATIENT MESSAGE (OUTPATIENT)
Dept: GASTROENTEROLOGY | Facility: CLINIC | Age: 35
End: 2020-04-02

## 2020-05-03 ENCOUNTER — PATIENT MESSAGE (OUTPATIENT)
Dept: GASTROENTEROLOGY | Facility: CLINIC | Age: 35
End: 2020-05-03

## 2020-05-11 ENCOUNTER — PATIENT MESSAGE (OUTPATIENT)
Dept: GASTROENTEROLOGY | Facility: CLINIC | Age: 35
End: 2020-05-11

## 2020-05-26 ENCOUNTER — PATIENT MESSAGE (OUTPATIENT)
Dept: GASTROENTEROLOGY | Facility: CLINIC | Age: 35
End: 2020-05-26

## 2020-07-01 ENCOUNTER — TELEPHONE (OUTPATIENT)
Dept: GASTROENTEROLOGY | Facility: CLINIC | Age: 35
End: 2020-07-01

## 2020-07-01 DIAGNOSIS — K51.00 ULCERATIVE PANCOLITIS: ICD-10-CM

## 2020-07-02 ENCOUNTER — PATIENT MESSAGE (OUTPATIENT)
Dept: GASTROENTEROLOGY | Facility: CLINIC | Age: 35
End: 2020-07-02

## 2020-07-02 DIAGNOSIS — K51.00 ULCERATIVE PANCOLITIS: Primary | ICD-10-CM

## 2020-07-02 RX ORDER — AZATHIOPRINE 50 MG/1
TABLET ORAL
Qty: 180 TABLET | Refills: 0 | Status: SHIPPED | OUTPATIENT
Start: 2020-07-02 | End: 2020-09-29

## 2020-07-02 NOTE — TELEPHONE ENCOUNTER
----- Message from Ryder Jameson MD sent at 7/2/2020 11:48 AM CDT -----  Call and also email if she doesn't answer that I have sent in refills for imuran but she is overdue for labs. I have placed orders for her to have CBC/LFTs at [facility]. Ask her to please go in next week if she can. Also place a reminder in box for 2 weeks from now to be sure she went and we received results    SS

## 2020-07-02 NOTE — TELEPHONE ENCOUNTER
Call and spoke to pt   -She will try to have labs done on tomorrow   -if unable to go tomorrow will go next week.   -informed pt it is very import for her to have labs done by next week.   Pt expressed understanding     Reminder set for 7/13 to make sure pt had labs done

## 2020-07-03 ENCOUNTER — PATIENT MESSAGE (OUTPATIENT)
Dept: GASTROENTEROLOGY | Facility: CLINIC | Age: 35
End: 2020-07-03

## 2020-07-05 LAB
ALBUMIN SERPL-MCNC: 4.9 G/DL (ref 3.8–4.8)
ALP SERPL-CCNC: 32 IU/L (ref 39–117)
ALT SERPL-CCNC: 12 IU/L (ref 0–32)
AST SERPL-CCNC: 16 IU/L (ref 0–40)
BASOPHILS # BLD AUTO: 0 X10E3/UL (ref 0–0.2)
BASOPHILS NFR BLD AUTO: 0 %
BILIRUB DIRECT SERPL-MCNC: 0.08 MG/DL (ref 0–0.4)
BILIRUB SERPL-MCNC: 0.3 MG/DL (ref 0–1.2)
EOSINOPHIL # BLD AUTO: 0.2 X10E3/UL (ref 0–0.4)
EOSINOPHIL NFR BLD AUTO: 5 %
ERYTHROCYTE [DISTWIDTH] IN BLOOD BY AUTOMATED COUNT: 13.4 % (ref 11.7–15.4)
HCT VFR BLD AUTO: 38.4 % (ref 34–46.6)
HGB BLD-MCNC: 12.4 G/DL (ref 11.1–15.9)
IMM GRANULOCYTES # BLD AUTO: 0 X10E3/UL (ref 0–0.1)
IMM GRANULOCYTES NFR BLD AUTO: 0 %
LYMPHOCYTES # BLD AUTO: 1.8 X10E3/UL (ref 0.7–3.1)
LYMPHOCYTES NFR BLD AUTO: 34 %
MCH RBC QN AUTO: 29.8 PG (ref 26.6–33)
MCHC RBC AUTO-ENTMCNC: 32.3 G/DL (ref 31.5–35.7)
MCV RBC AUTO: 92 FL (ref 79–97)
MONOCYTES # BLD AUTO: 0.3 X10E3/UL (ref 0.1–0.9)
MONOCYTES NFR BLD AUTO: 5 %
NEUTROPHILS # BLD AUTO: 2.9 X10E3/UL (ref 1.4–7)
NEUTROPHILS NFR BLD AUTO: 56 %
PLATELET # BLD AUTO: 277 X10E3/UL (ref 150–450)
PROT SERPL-MCNC: 7.4 G/DL (ref 6–8.5)
RBC # BLD AUTO: 4.16 X10E6/UL (ref 3.77–5.28)
WBC # BLD AUTO: 5.2 X10E3/UL (ref 3.4–10.8)

## 2020-07-09 ENCOUNTER — PATIENT MESSAGE (OUTPATIENT)
Dept: GASTROENTEROLOGY | Facility: CLINIC | Age: 35
End: 2020-07-09

## 2020-07-12 ENCOUNTER — PATIENT MESSAGE (OUTPATIENT)
Dept: GASTROENTEROLOGY | Facility: CLINIC | Age: 35
End: 2020-07-12

## 2020-07-13 ENCOUNTER — TELEPHONE (OUTPATIENT)
Dept: GASTROENTEROLOGY | Facility: CLINIC | Age: 35
End: 2020-07-13

## 2020-07-13 NOTE — TELEPHONE ENCOUNTER
[facility]       Collected: 7/3/2020    CBC Results  WBC:       5.2  Hgb:         12.4  Hct:          38.4  Platelets:  13.4        LFT Results  Alk Phos:           32      Range   Albumin:            4.9     Range 3.8-4.8   Total Bili:           0.3     AST:                 32           ALT:                 12

## 2020-07-30 ENCOUNTER — PATIENT MESSAGE (OUTPATIENT)
Dept: GASTROENTEROLOGY | Facility: CLINIC | Age: 35
End: 2020-07-30

## 2020-07-30 ENCOUNTER — TELEPHONE (OUTPATIENT)
Dept: GASTROENTEROLOGY | Facility: CLINIC | Age: 35
End: 2020-07-30

## 2020-07-30 NOTE — TELEPHONE ENCOUNTER
----- Message from Ryder Jameson MD sent at 7/30/2020 11:31 AM CDT -----  Follow up with portal message    SS  ----- Message -----  From: Celestina Maloney MA  Sent: 7/9/2020   8:40 AM CDT  To: Ryder Jameson MD    FYI       Called to confirm appt for Wed pt cancel. She has to check her schedule. She will send portal message to reschedule appt

## 2020-09-29 DIAGNOSIS — K51.00 ULCERATIVE PANCOLITIS: Primary | ICD-10-CM

## 2020-09-30 ENCOUNTER — TELEPHONE (OUTPATIENT)
Dept: GASTROENTEROLOGY | Facility: HOSPITAL | Age: 35
End: 2020-09-30

## 2020-09-30 NOTE — TELEPHONE ENCOUNTER
----- Message from Ryder Jameson MD sent at 9/29/2020  5:09 PM CDT -----  Call and let pt know to get labs done at [facility]. Orders are in. I have refilled meds already for 3 mos    SS

## 2020-09-30 NOTE — TELEPHONE ENCOUNTER
Called pt, no answer, left detailed message that we sent in refills and to go to any [facility] facility to get her labs drawn.  Left direct line to return call     Portal message sent

## 2020-10-04 ENCOUNTER — PATIENT MESSAGE (OUTPATIENT)
Dept: GASTROENTEROLOGY | Facility: HOSPITAL | Age: 35
End: 2020-10-04

## 2020-10-05 ENCOUNTER — PATIENT MESSAGE (OUTPATIENT)
Dept: GASTROENTEROLOGY | Facility: HOSPITAL | Age: 35
End: 2020-10-05

## 2020-10-07 ENCOUNTER — OFFICE VISIT (OUTPATIENT)
Dept: GASTROENTEROLOGY | Facility: CLINIC | Age: 35
End: 2020-10-07
Attending: INTERNAL MEDICINE
Payer: COMMERCIAL

## 2020-10-07 DIAGNOSIS — K51.00 ULCERATIVE PANCOLITIS: Primary | ICD-10-CM

## 2020-10-07 PROCEDURE — 99214 PR OFFICE/OUTPT VISIT, EST, LEVL IV, 30-39 MIN: ICD-10-PCS | Mod: 95,,, | Performed by: INTERNAL MEDICINE

## 2020-10-07 PROCEDURE — 99214 OFFICE O/P EST MOD 30 MIN: CPT | Mod: 95,,, | Performed by: INTERNAL MEDICINE

## 2020-10-07 RX ORDER — LEVONORGESTREL AND ETHINYL ESTRADIOL 0.1-0.02MG
1 KIT ORAL DAILY
COMMUNITY
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
## 2020-10-07 NOTE — PATIENT INSTRUCTIONS
Get your pap smear done  Turn in stool calprotectin at [facility]  Put reminder in box to check to see if we received pt labs from [facility] by 10/9/20  Find out how we need to get your colonoscopy done and let me know  Get prevnar 13 and shingrix  Flu shot document 10/2/20

## 2020-10-08 ENCOUNTER — TELEPHONE (OUTPATIENT)
Dept: GASTROENTEROLOGY | Facility: CLINIC | Age: 35
End: 2020-10-08

## 2020-10-09 ENCOUNTER — PATIENT MESSAGE (OUTPATIENT)
Dept: GASTROENTEROLOGY | Facility: CLINIC | Age: 35
End: 2020-10-09

## 2020-10-09 LAB
25(OH)D3+25(OH)D2 SERPL-MCNC: 46 NG/ML (ref 30–100)
ALBUMIN SERPL-MCNC: 4.4 G/DL (ref 3.8–4.8)
ALBUMIN/GLOB SERPL: 1.8 {RATIO} (ref 1.2–2.2)
ALP SERPL-CCNC: 29 IU/L (ref 39–117)
ALT SERPL-CCNC: 10 IU/L (ref 0–32)
AST SERPL-CCNC: 13 IU/L (ref 0–40)
BASOPHILS # BLD AUTO: 0 X10E3/UL (ref 0–0.2)
BASOPHILS NFR BLD AUTO: 0 %
BILIRUB SERPL-MCNC: 0.3 MG/DL (ref 0–1.2)
BUN SERPL-MCNC: 13 MG/DL (ref 6–20)
BUN/CREAT SERPL: 16 (ref 9–23)
CALCIUM SERPL-MCNC: 9.2 MG/DL (ref 8.7–10.2)
CHLORIDE SERPL-SCNC: 105 MMOL/L (ref 96–106)
CO2 SERPL-SCNC: 23 MMOL/L (ref 20–29)
CREAT SERPL-MCNC: 0.81 MG/DL (ref 0.57–1)
EOSINOPHIL # BLD AUTO: 0.2 X10E3/UL (ref 0–0.4)
EOSINOPHIL NFR BLD AUTO: 5 %
ERYTHROCYTE [DISTWIDTH] IN BLOOD BY AUTOMATED COUNT: 13.5 % (ref 11.7–15.4)
GAMMA INTERFERON BACKGROUND BLD IA-ACNC: 0.04 IU/ML
GLOBULIN SER CALC-MCNC: 2.5 G/DL (ref 1.5–4.5)
GLUCOSE SERPL-MCNC: 99 MG/DL (ref 65–99)
HBV CORE AB SERPL QL IA: NEGATIVE
HBV SURFACE AG SERPL QL IA: NEGATIVE
HCT VFR BLD AUTO: 37.7 % (ref 34–46.6)
HGB BLD-MCNC: 12.6 G/DL (ref 11.1–15.9)
IMM GRANULOCYTES # BLD AUTO: 0 X10E3/UL (ref 0–0.1)
IMM GRANULOCYTES NFR BLD AUTO: 0 %
LYMPHOCYTES # BLD AUTO: 1.7 X10E3/UL (ref 0.7–3.1)
LYMPHOCYTES NFR BLD AUTO: 39 %
M TB IFN-G BLD-IMP: NEGATIVE
M TB IFN-G CD4+ BCKGRND COR BLD-ACNC: 0.04 IU/ML
MCH RBC QN AUTO: 31 PG (ref 26.6–33)
MCHC RBC AUTO-ENTMCNC: 33.4 G/DL (ref 31.5–35.7)
MCV RBC AUTO: 93 FL (ref 79–97)
MITOGEN IGNF BLD-ACNC: 6.97 IU/ML
MONOCYTES # BLD AUTO: 0.2 X10E3/UL (ref 0.1–0.9)
MONOCYTES NFR BLD AUTO: 5 %
NEUTROPHILS # BLD AUTO: 2.3 X10E3/UL (ref 1.4–7)
NEUTROPHILS NFR BLD AUTO: 51 %
PLATELET # BLD AUTO: 290 X10E3/UL (ref 150–450)
POTASSIUM SERPL-SCNC: 4.1 MMOL/L (ref 3.5–5.2)
PROT SERPL-MCNC: 6.9 G/DL (ref 6–8.5)
QUANTIFERON TB GOLD (INCUBATED): NORMAL
QUANTIFERON TB2 AG VALUE: 0.04 IU/ML
RBC # BLD AUTO: 4.06 X10E6/UL (ref 3.77–5.28)
SERVICE CMNT-IMP: NORMAL
SODIUM SERPL-SCNC: 140 MMOL/L (ref 134–144)
VIT B12 SERPL-MCNC: 440 PG/ML (ref 232–1245)
WBC # BLD AUTO: 4.5 X10E3/UL (ref 3.4–10.8)

## 2020-11-17 ENCOUNTER — PATIENT MESSAGE (OUTPATIENT)
Dept: GASTROENTEROLOGY | Facility: CLINIC | Age: 35
End: 2020-11-17

## 2020-11-18 ENCOUNTER — PATIENT MESSAGE (OUTPATIENT)
Dept: GASTROENTEROLOGY | Facility: CLINIC | Age: 35
End: 2020-11-18

## 2020-11-22 ENCOUNTER — PATIENT MESSAGE (OUTPATIENT)
Dept: GASTROENTEROLOGY | Facility: CLINIC | Age: 35
End: 2020-11-22

## 2020-12-16 ENCOUNTER — TELEPHONE (OUTPATIENT)
Dept: GASTROENTEROLOGY | Facility: CLINIC | Age: 35
End: 2020-12-16

## 2020-12-16 ENCOUNTER — PATIENT MESSAGE (OUTPATIENT)
Dept: GASTROENTEROLOGY | Facility: CLINIC | Age: 35
End: 2020-12-16

## 2020-12-30 ENCOUNTER — PATIENT MESSAGE (OUTPATIENT)
Dept: GASTROENTEROLOGY | Facility: CLINIC | Age: 35
End: 2020-12-30

## 2021-01-04 ENCOUNTER — PATIENT MESSAGE (OUTPATIENT)
Dept: GASTROENTEROLOGY | Facility: CLINIC | Age: 36
End: 2021-01-04

## 2021-01-05 ENCOUNTER — PATIENT MESSAGE (OUTPATIENT)
Dept: GASTROENTEROLOGY | Facility: CLINIC | Age: 36
End: 2021-01-05

## 2021-01-13 ENCOUNTER — PATIENT MESSAGE (OUTPATIENT)
Dept: GASTROENTEROLOGY | Facility: CLINIC | Age: 36
End: 2021-01-13

## 2021-01-13 DIAGNOSIS — K51.00 ULCERATIVE PANCOLITIS: ICD-10-CM

## 2021-01-13 RX ORDER — GOLIMUMAB 100 MG/ML
INJECTION, SOLUTION SUBCUTANEOUS
Qty: 1 ML | Refills: 5 | Status: SHIPPED | OUTPATIENT
Start: 2021-01-13 | End: 2021-06-04 | Stop reason: SDUPTHER

## 2021-01-15 ENCOUNTER — PATIENT MESSAGE (OUTPATIENT)
Dept: GASTROENTEROLOGY | Facility: CLINIC | Age: 36
End: 2021-01-15

## 2021-02-11 ENCOUNTER — PATIENT MESSAGE (OUTPATIENT)
Dept: GASTROENTEROLOGY | Facility: CLINIC | Age: 36
End: 2021-02-11

## 2021-02-12 ENCOUNTER — PATIENT MESSAGE (OUTPATIENT)
Dept: GASTROENTEROLOGY | Facility: CLINIC | Age: 36
End: 2021-02-12

## 2021-02-16 ENCOUNTER — PATIENT MESSAGE (OUTPATIENT)
Dept: GASTROENTEROLOGY | Facility: CLINIC | Age: 36
End: 2021-02-16

## 2021-02-19 ENCOUNTER — PATIENT MESSAGE (OUTPATIENT)
Dept: GASTROENTEROLOGY | Facility: CLINIC | Age: 36
End: 2021-02-19

## 2021-02-21 ENCOUNTER — PATIENT MESSAGE (OUTPATIENT)
Dept: GASTROENTEROLOGY | Facility: CLINIC | Age: 36
End: 2021-02-21

## 2021-02-23 ENCOUNTER — PATIENT MESSAGE (OUTPATIENT)
Dept: GASTROENTEROLOGY | Facility: CLINIC | Age: 36
End: 2021-02-23

## 2021-03-10 ENCOUNTER — PATIENT MESSAGE (OUTPATIENT)
Dept: GASTROENTEROLOGY | Facility: CLINIC | Age: 36
End: 2021-03-10

## 2021-03-10 DIAGNOSIS — K51.00 ULCERATIVE PANCOLITIS: Primary | ICD-10-CM

## 2021-03-10 DIAGNOSIS — K51.00 ULCERATIVE PANCOLITIS: ICD-10-CM

## 2021-03-10 RX ORDER — AZATHIOPRINE 50 MG/1
TABLET ORAL
Qty: 180 TABLET | Refills: 0 | Status: SHIPPED | OUTPATIENT
Start: 2021-03-10 | End: 2021-06-01

## 2021-04-07 DIAGNOSIS — K51.00 ULCERATIVE PANCOLITIS: Primary | ICD-10-CM

## 2021-04-14 ENCOUNTER — TELEPHONE (OUTPATIENT)
Dept: GASTROENTEROLOGY | Facility: CLINIC | Age: 36
End: 2021-04-14

## 2021-04-14 ENCOUNTER — PATIENT MESSAGE (OUTPATIENT)
Dept: GASTROENTEROLOGY | Facility: CLINIC | Age: 36
End: 2021-04-14

## 2021-04-14 DIAGNOSIS — K51.00 ULCERATIVE PANCOLITIS: Primary | ICD-10-CM

## 2021-04-16 ENCOUNTER — PATIENT MESSAGE (OUTPATIENT)
Dept: RESEARCH | Facility: HOSPITAL | Age: 36
End: 2021-04-16

## 2021-04-16 ENCOUNTER — PATIENT MESSAGE (OUTPATIENT)
Dept: GASTROENTEROLOGY | Facility: CLINIC | Age: 36
End: 2021-04-16

## 2021-05-11 ENCOUNTER — PATIENT MESSAGE (OUTPATIENT)
Dept: GASTROENTEROLOGY | Facility: CLINIC | Age: 36
End: 2021-05-11

## 2021-05-11 LAB — CALPROTECTIN STL-MCNT: 39 UG/G (ref 0–120)

## 2021-05-15 LAB
6-TGN ENTSUB RBC: 182 PMOL/8X 10E8
6MMP ENTSUB RBC: 2648 PMOL/8X 10E8
ALBUMIN SERPL-MCNC: 4.6 G/DL (ref 3.8–4.8)
ALBUMIN/GLOB SERPL: 1.9 {RATIO} (ref 1.2–2.2)
ALP SERPL-CCNC: 26 IU/L (ref 39–117)
ALT SERPL-CCNC: 9 IU/L (ref 0–32)
AST SERPL-CCNC: 15 IU/L (ref 0–40)
BASOPHILS # BLD AUTO: 0 X10E3/UL (ref 0–0.2)
BASOPHILS NFR BLD AUTO: 1 %
BILIRUB SERPL-MCNC: 0.3 MG/DL (ref 0–1.2)
BUN SERPL-MCNC: 11 MG/DL (ref 6–20)
BUN/CREAT SERPL: 14 (ref 9–23)
CALCIUM SERPL-MCNC: 9.1 MG/DL (ref 8.7–10.2)
CHLORIDE SERPL-SCNC: 103 MMOL/L (ref 96–106)
CO2 SERPL-SCNC: 24 MMOL/L (ref 20–29)
CREAT SERPL-MCNC: 0.81 MG/DL (ref 0.57–1)
EOSINOPHIL # BLD AUTO: 0.2 X10E3/UL (ref 0–0.4)
EOSINOPHIL NFR BLD AUTO: 4 %
ERYTHROCYTE [DISTWIDTH] IN BLOOD BY AUTOMATED COUNT: 14 % (ref 11.7–15.4)
GLOBULIN SER CALC-MCNC: 2.4 G/DL (ref 1.5–4.5)
GLUCOSE SERPL-MCNC: 96 MG/DL (ref 65–99)
HCT VFR BLD AUTO: 36.1 % (ref 34–46.6)
HGB BLD-MCNC: 11.7 G/DL (ref 11.1–15.9)
IMM GRANULOCYTES # BLD AUTO: 0 X10E3/UL (ref 0–0.1)
IMM GRANULOCYTES NFR BLD AUTO: 0 %
LYMPHOCYTES # BLD AUTO: 1.7 X10E3/UL (ref 0.7–3.1)
LYMPHOCYTES NFR BLD AUTO: 35 %
MCH RBC QN AUTO: 29.9 PG (ref 26.6–33)
MCHC RBC AUTO-ENTMCNC: 32.4 G/DL (ref 31.5–35.7)
MCV RBC AUTO: 92 FL (ref 79–97)
MONOCYTES # BLD AUTO: 0.2 X10E3/UL (ref 0.1–0.9)
MONOCYTES NFR BLD AUTO: 5 %
NEUTROPHILS # BLD AUTO: 2.7 X10E3/UL (ref 1.4–7)
NEUTROPHILS NFR BLD AUTO: 55 %
PLATELET # BLD AUTO: 288 X10E3/UL (ref 150–450)
POTASSIUM SERPL-SCNC: 4.2 MMOL/L (ref 3.5–5.2)
PROT SERPL-MCNC: 7 G/DL (ref 6–8.5)
RBC # BLD AUTO: 3.91 X10E6/UL (ref 3.77–5.28)
SODIUM SERPL-SCNC: 140 MMOL/L (ref 134–144)
WBC # BLD AUTO: 4.8 X10E3/UL (ref 3.4–10.8)

## 2021-05-17 ENCOUNTER — TELEPHONE (OUTPATIENT)
Dept: GASTROENTEROLOGY | Facility: CLINIC | Age: 36
End: 2021-05-17

## 2021-06-02 ENCOUNTER — PATIENT MESSAGE (OUTPATIENT)
Dept: GASTROENTEROLOGY | Facility: CLINIC | Age: 36
End: 2021-06-02

## 2021-06-04 ENCOUNTER — TELEPHONE (OUTPATIENT)
Dept: GASTROENTEROLOGY | Facility: CLINIC | Age: 36
End: 2021-06-04

## 2021-06-04 DIAGNOSIS — K51.00 ULCERATIVE PANCOLITIS: ICD-10-CM

## 2021-06-04 RX ORDER — GOLIMUMAB 100 MG/ML
INJECTION, SOLUTION SUBCUTANEOUS
Qty: 1 ML | Refills: 5 | Status: SHIPPED | OUTPATIENT
Start: 2021-06-04 | End: 2021-10-06

## 2021-06-08 ENCOUNTER — PATIENT MESSAGE (OUTPATIENT)
Dept: GASTROENTEROLOGY | Facility: CLINIC | Age: 36
End: 2021-06-08

## 2021-06-28 ENCOUNTER — PATIENT MESSAGE (OUTPATIENT)
Dept: GASTROENTEROLOGY | Facility: CLINIC | Age: 36
End: 2021-06-28

## 2021-06-30 ENCOUNTER — PATIENT MESSAGE (OUTPATIENT)
Dept: GASTROENTEROLOGY | Facility: CLINIC | Age: 36
End: 2021-06-30

## 2021-07-01 ENCOUNTER — TELEPHONE (OUTPATIENT)
Dept: GASTROENTEROLOGY | Facility: CLINIC | Age: 36
End: 2021-07-01

## 2021-07-01 DIAGNOSIS — K51.00 ULCERATIVE PANCOLITIS: Primary | ICD-10-CM

## 2021-07-13 ENCOUNTER — TELEPHONE (OUTPATIENT)
Dept: ENDOSCOPY | Facility: HOSPITAL | Age: 36
End: 2021-07-13

## 2021-08-01 ENCOUNTER — OFFICE VISIT (OUTPATIENT)
Dept: URGENT CARE | Facility: CLINIC | Age: 36
End: 2021-08-01
Payer: COMMERCIAL

## 2021-08-01 VITALS
RESPIRATION RATE: 14 BRPM | SYSTOLIC BLOOD PRESSURE: 116 MMHG | BODY MASS INDEX: 24.99 KG/M2 | HEART RATE: 106 BPM | TEMPERATURE: 101 F | HEIGHT: 65 IN | WEIGHT: 150 LBS | OXYGEN SATURATION: 96 % | DIASTOLIC BLOOD PRESSURE: 80 MMHG

## 2021-08-01 DIAGNOSIS — R50.9 FEVER, UNSPECIFIED FEVER CAUSE: ICD-10-CM

## 2021-08-01 DIAGNOSIS — U07.1 COVID-19 VIRUS INFECTION: Primary | ICD-10-CM

## 2021-08-01 DIAGNOSIS — R05.9 COUGH: ICD-10-CM

## 2021-08-01 DIAGNOSIS — U07.1 COVID-19 VIRUS DETECTED: ICD-10-CM

## 2021-08-01 LAB
CTP QC/QA: YES
SARS-COV-2 RDRP RESP QL NAA+PROBE: POSITIVE

## 2021-08-01 PROCEDURE — U0002: ICD-10-PCS | Mod: QW,S$GLB,, | Performed by: NURSE PRACTITIONER

## 2021-08-01 PROCEDURE — 99203 PR OFFICE/OUTPT VISIT, NEW, LEVL III, 30-44 MIN: ICD-10-PCS | Mod: S$GLB,,, | Performed by: NURSE PRACTITIONER

## 2021-08-01 PROCEDURE — U0002 COVID-19 LAB TEST NON-CDC: HCPCS | Mod: QW,S$GLB,, | Performed by: NURSE PRACTITIONER

## 2021-08-01 PROCEDURE — 99203 OFFICE O/P NEW LOW 30 MIN: CPT | Mod: S$GLB,,, | Performed by: NURSE PRACTITIONER

## 2021-08-01 RX ORDER — PROMETHAZINE HYDROCHLORIDE AND DEXTROMETHORPHAN HYDROBROMIDE 6.25; 15 MG/5ML; MG/5ML
5 SYRUP ORAL
Qty: 118 ML | Refills: 0 | Status: SHIPPED | OUTPATIENT
Start: 2021-08-01 | End: 2021-10-06

## 2021-08-01 RX ORDER — ALBUTEROL SULFATE 90 UG/1
1-2 AEROSOL, METERED RESPIRATORY (INHALATION)
Qty: 8 G | Refills: 0 | Status: SHIPPED | OUTPATIENT
Start: 2021-08-01 | End: 2021-10-06

## 2021-08-01 RX ORDER — BENZONATATE 100 MG/1
200 CAPSULE ORAL 3 TIMES DAILY PRN
Qty: 20 CAPSULE | Refills: 0 | Status: SHIPPED | OUTPATIENT
Start: 2021-08-01 | End: 2021-10-06

## 2021-08-02 ENCOUNTER — PATIENT MESSAGE (OUTPATIENT)
Dept: GASTROENTEROLOGY | Facility: CLINIC | Age: 36
End: 2021-08-02

## 2021-08-04 ENCOUNTER — PATIENT MESSAGE (OUTPATIENT)
Dept: GASTROENTEROLOGY | Facility: CLINIC | Age: 36
End: 2021-08-04

## 2021-08-06 ENCOUNTER — TELEPHONE (OUTPATIENT)
Dept: GASTROENTEROLOGY | Facility: CLINIC | Age: 36
End: 2021-08-06

## 2021-08-06 ENCOUNTER — PATIENT MESSAGE (OUTPATIENT)
Dept: GASTROENTEROLOGY | Facility: CLINIC | Age: 36
End: 2021-08-06

## 2021-08-09 ENCOUNTER — PATIENT MESSAGE (OUTPATIENT)
Dept: GASTROENTEROLOGY | Facility: CLINIC | Age: 36
End: 2021-08-09

## 2021-08-11 ENCOUNTER — PATIENT MESSAGE (OUTPATIENT)
Dept: GASTROENTEROLOGY | Facility: CLINIC | Age: 36
End: 2021-08-11

## 2021-08-11 ENCOUNTER — TELEPHONE (OUTPATIENT)
Dept: GASTROENTEROLOGY | Facility: CLINIC | Age: 36
End: 2021-08-11

## 2021-08-25 ENCOUNTER — PATIENT MESSAGE (OUTPATIENT)
Dept: GASTROENTEROLOGY | Facility: CLINIC | Age: 36
End: 2021-08-25

## 2021-09-15 ENCOUNTER — PATIENT MESSAGE (OUTPATIENT)
Dept: GASTROENTEROLOGY | Facility: CLINIC | Age: 36
End: 2021-09-15

## 2021-09-15 DIAGNOSIS — K51.00 ULCERATIVE PANCOLITIS: ICD-10-CM

## 2021-09-15 RX ORDER — AZATHIOPRINE 50 MG/1
TABLET ORAL
Qty: 60 TABLET | Refills: 0 | Status: SHIPPED | OUTPATIENT
Start: 2021-09-15 | End: 2021-10-06

## 2021-09-17 ENCOUNTER — PATIENT MESSAGE (OUTPATIENT)
Dept: GASTROENTEROLOGY | Facility: CLINIC | Age: 36
End: 2021-09-17

## 2021-09-19 ENCOUNTER — PATIENT MESSAGE (OUTPATIENT)
Dept: GASTROENTEROLOGY | Facility: CLINIC | Age: 36
End: 2021-09-19

## 2021-09-24 ENCOUNTER — TELEPHONE (OUTPATIENT)
Dept: GASTROENTEROLOGY | Facility: CLINIC | Age: 36
End: 2021-09-24

## 2021-09-27 ENCOUNTER — PATIENT MESSAGE (OUTPATIENT)
Dept: GASTROENTEROLOGY | Facility: CLINIC | Age: 36
End: 2021-09-27

## 2021-09-30 LAB
25(OH)D3+25(OH)D2 SERPL-MCNC: 40.2 NG/ML (ref 30–100)
ALBUMIN SERPL-MCNC: 4.5 G/DL (ref 3.8–4.8)
ALBUMIN/GLOB SERPL: 2 {RATIO} (ref 1.2–2.2)
ALP SERPL-CCNC: 26 IU/L (ref 44–121)
ALT SERPL-CCNC: 8 IU/L (ref 0–32)
AST SERPL-CCNC: 15 IU/L (ref 0–40)
BASOPHILS # BLD AUTO: 0 X10E3/UL (ref 0–0.2)
BASOPHILS NFR BLD AUTO: 1 %
BILIRUB SERPL-MCNC: 0.3 MG/DL (ref 0–1.2)
BUN SERPL-MCNC: 11 MG/DL (ref 6–20)
BUN/CREAT SERPL: 15 (ref 9–23)
CALCIUM SERPL-MCNC: 9 MG/DL (ref 8.7–10.2)
CHLORIDE SERPL-SCNC: 103 MMOL/L (ref 96–106)
CO2 SERPL-SCNC: 22 MMOL/L (ref 20–29)
CREAT SERPL-MCNC: 0.75 MG/DL (ref 0.57–1)
EOSINOPHIL # BLD AUTO: 0.2 X10E3/UL (ref 0–0.4)
EOSINOPHIL NFR BLD AUTO: 4 %
ERYTHROCYTE [DISTWIDTH] IN BLOOD BY AUTOMATED COUNT: 13.6 % (ref 11.7–15.4)
GAMMA INTERFERON BACKGROUND BLD IA-ACNC: 0.03 IU/ML
GLOBULIN SER CALC-MCNC: 2.2 G/DL (ref 1.5–4.5)
GLUCOSE SERPL-MCNC: 79 MG/DL (ref 65–99)
HBV CORE AB SERPL QL IA: NEGATIVE
HBV SURFACE AG SERPL QL IA: NEGATIVE
HCT VFR BLD AUTO: 34.4 % (ref 34–46.6)
HGB BLD-MCNC: 11.4 G/DL (ref 11.1–15.9)
IMM GRANULOCYTES # BLD AUTO: 0 X10E3/UL (ref 0–0.1)
IMM GRANULOCYTES NFR BLD AUTO: 0 %
LYMPHOCYTES # BLD AUTO: 1.6 X10E3/UL (ref 0.7–3.1)
LYMPHOCYTES NFR BLD AUTO: 35 %
M TB IFN-G BLD-IMP: NEGATIVE
M TB IFN-G CD4+ BCKGRND COR BLD-ACNC: 0.03 IU/ML
MCH RBC QN AUTO: 31.2 PG (ref 26.6–33)
MCHC RBC AUTO-ENTMCNC: 33.1 G/DL (ref 31.5–35.7)
MCV RBC AUTO: 94 FL (ref 79–97)
MITOGEN IGNF BLD-ACNC: >10 IU/ML
MONOCYTES # BLD AUTO: 0.2 X10E3/UL (ref 0.1–0.9)
MONOCYTES NFR BLD AUTO: 5 %
NEUTROPHILS # BLD AUTO: 2.5 X10E3/UL (ref 1.4–7)
NEUTROPHILS NFR BLD AUTO: 55 %
PLATELET # BLD AUTO: 271 X10E3/UL (ref 150–450)
POTASSIUM SERPL-SCNC: 4.3 MMOL/L (ref 3.5–5.2)
PROT SERPL-MCNC: 6.7 G/DL (ref 6–8.5)
QUANTIFERON TB GOLD (INCUBATED): NORMAL
QUANTIFERON TB2 AG VALUE: 0.03 IU/ML
RBC # BLD AUTO: 3.65 X10E6/UL (ref 3.77–5.28)
SERVICE CMNT-IMP: NORMAL
SODIUM SERPL-SCNC: 138 MMOL/L (ref 134–144)
VIT B12 SERPL-MCNC: 180 PG/ML (ref 232–1245)
WBC # BLD AUTO: 4.5 X10E3/UL (ref 3.4–10.8)

## 2021-10-05 ENCOUNTER — TELEPHONE (OUTPATIENT)
Dept: GASTROENTEROLOGY | Facility: CLINIC | Age: 36
End: 2021-10-05

## 2021-10-06 ENCOUNTER — OFFICE VISIT (OUTPATIENT)
Dept: GASTROENTEROLOGY | Facility: CLINIC | Age: 36
End: 2021-10-06
Payer: COMMERCIAL

## 2021-10-06 DIAGNOSIS — K51.00 ULCERATIVE PANCOLITIS: Primary | ICD-10-CM

## 2021-10-06 PROCEDURE — 99214 PR OFFICE/OUTPT VISIT, EST, LEVL IV, 30-39 MIN: ICD-10-PCS | Mod: 95,,, | Performed by: INTERNAL MEDICINE

## 2021-10-06 PROCEDURE — 99214 OFFICE O/P EST MOD 30 MIN: CPT | Mod: 95,,, | Performed by: INTERNAL MEDICINE

## 2021-10-06 RX ORDER — AZATHIOPRINE 50 MG/1
100 TABLET ORAL DAILY
Qty: 180 TABLET | Refills: 0 | Status: SHIPPED | OUTPATIENT
Start: 2021-10-06 | End: 2022-03-10

## 2021-10-06 RX ORDER — MESALAMINE 1.2 G/1
4.8 TABLET, DELAYED RELEASE ORAL
Qty: 360 TABLET | Refills: 3 | Status: SHIPPED | OUTPATIENT
Start: 2021-10-06 | End: 2022-10-24

## 2021-10-07 ENCOUNTER — TELEPHONE (OUTPATIENT)
Dept: GASTROENTEROLOGY | Facility: CLINIC | Age: 36
End: 2021-10-07

## 2021-10-11 ENCOUNTER — TELEPHONE (OUTPATIENT)
Dept: GASTROENTEROLOGY | Facility: CLINIC | Age: 36
End: 2021-10-11

## 2021-10-13 ENCOUNTER — PATIENT MESSAGE (OUTPATIENT)
Dept: GASTROENTEROLOGY | Facility: CLINIC | Age: 36
End: 2021-10-13

## 2021-11-09 ENCOUNTER — TELEPHONE (OUTPATIENT)
Dept: ENDOSCOPY | Facility: HOSPITAL | Age: 36
End: 2021-11-09
Payer: COMMERCIAL

## 2021-11-09 NOTE — TELEPHONE ENCOUNTER
Maria Fernanda,     Patient has an  order in chart for Colonoscopy that has been cancelled. If procedure is still needed, please enter new order.    Thanks,  NUPUR Martinez

## 2021-12-07 ENCOUNTER — TELEPHONE (OUTPATIENT)
Dept: GASTROENTEROLOGY | Facility: CLINIC | Age: 36
End: 2021-12-07
Payer: COMMERCIAL

## 2021-12-22 ENCOUNTER — TELEPHONE (OUTPATIENT)
Dept: GASTROENTEROLOGY | Facility: CLINIC | Age: 36
End: 2021-12-22
Payer: COMMERCIAL

## 2021-12-22 ENCOUNTER — PATIENT MESSAGE (OUTPATIENT)
Dept: GASTROENTEROLOGY | Facility: CLINIC | Age: 36
End: 2021-12-22
Payer: COMMERCIAL

## 2022-01-01 ENCOUNTER — PATIENT MESSAGE (OUTPATIENT)
Dept: GASTROENTEROLOGY | Facility: CLINIC | Age: 37
End: 2022-01-01
Payer: COMMERCIAL

## 2022-01-05 ENCOUNTER — PATIENT MESSAGE (OUTPATIENT)
Dept: GASTROENTEROLOGY | Facility: CLINIC | Age: 37
End: 2022-01-05
Payer: COMMERCIAL

## 2022-01-11 ENCOUNTER — PATIENT MESSAGE (OUTPATIENT)
Dept: GASTROENTEROLOGY | Facility: CLINIC | Age: 37
End: 2022-01-11
Payer: COMMERCIAL

## 2022-01-12 ENCOUNTER — PATIENT MESSAGE (OUTPATIENT)
Dept: GASTROENTEROLOGY | Facility: CLINIC | Age: 37
End: 2022-01-12
Payer: COMMERCIAL

## 2022-01-13 ENCOUNTER — PATIENT MESSAGE (OUTPATIENT)
Dept: GASTROENTEROLOGY | Facility: CLINIC | Age: 37
End: 2022-01-13
Payer: COMMERCIAL

## 2022-01-14 ENCOUNTER — PATIENT MESSAGE (OUTPATIENT)
Dept: GASTROENTEROLOGY | Facility: CLINIC | Age: 37
End: 2022-01-14
Payer: COMMERCIAL

## 2022-01-16 ENCOUNTER — PATIENT MESSAGE (OUTPATIENT)
Dept: GASTROENTEROLOGY | Facility: CLINIC | Age: 37
End: 2022-01-16
Payer: COMMERCIAL

## 2022-03-10 DIAGNOSIS — K51.00 ULCERATIVE PANCOLITIS: ICD-10-CM

## 2022-03-10 RX ORDER — AZATHIOPRINE 50 MG/1
TABLET ORAL
Qty: 60 TABLET | Refills: 0 | Status: SHIPPED | OUTPATIENT
Start: 2022-03-10 | End: 2022-04-04

## 2022-03-11 ENCOUNTER — PATIENT MESSAGE (OUTPATIENT)
Dept: GASTROENTEROLOGY | Facility: CLINIC | Age: 37
End: 2022-03-11
Payer: COMMERCIAL

## 2022-03-11 NOTE — TELEPHONE ENCOUNTER
----- Message from Ryder Jameson MD sent at 3/10/2022  3:56 PM CST -----  Call or email pt to ask her to get labs asap. Last labs were 7/2021 and she is on azathioprine and simponi. Also was she able to get colonoscopy with Dr. Welsh?     Her appt is virtual 4/6- is she able to see us in person? IF not keep as virtual appt    SS

## 2022-03-17 DIAGNOSIS — K51.00 ULCERATIVE PANCOLITIS: ICD-10-CM

## 2022-03-17 NOTE — TELEPHONE ENCOUNTER
Refill request for Simpion    Allergies reviewed     Drug Monitoring labs:  CBC and CMP every 6 months  TB Gold, HBsAG and HBcAb yearly       Lab Results   Component Value Date    HEPBSAG Negative 09/27/2021    HEPBCAB Negative 09/27/2021     Lab Results   Component Value Date    TBGOLDPLUS Negative 10/01/2018     Lab Results   Component Value Date    [id number] 40.2 09/27/2021    [id number] 180 (L) 09/27/2021     Lab Results   Component Value Date    WBC 4.5 09/27/2021    HGB 11.4 09/27/2021    HCT 34.4 09/27/2021    MCV 94 09/27/2021     09/27/2021     Lab Results   Component Value Date    CREATININE 0.75 09/27/2021    ALBUMIN 4.5 09/27/2021    BILITOT 0.3 09/27/2021    ALKPHOS 32 (L) 07/03/2020    AST 15 09/27/2021    ALT 8 09/27/2021       Labs due:  3/2022    Next appt: 4/6/2022 left message for pt to call back and [employer] sent.     RX pended

## 2022-04-01 NOTE — TELEPHONE ENCOUNTER
Received called from patient stating colonoscopy is schedule for 4/29/2022.  She would like to reschedule f/u appt with Dr Jameson after colonoscopy,  Left message for pt to call back to reschedule f/u appointment

## 2022-04-14 LAB
ALBUMIN SERPL-MCNC: 4.8 G/DL (ref 3.8–4.8)
ALBUMIN/GLOB SERPL: 1.8 {RATIO} (ref 1.2–2.2)
ALP SERPL-CCNC: 28 IU/L (ref 44–121)
ALT SERPL-CCNC: 13 IU/L (ref 0–32)
AST SERPL-CCNC: 16 IU/L (ref 0–40)
BASOPHILS # BLD AUTO: 0 X10E3/UL (ref 0–0.2)
BASOPHILS NFR BLD AUTO: 0 %
BILIRUB SERPL-MCNC: 0.3 MG/DL (ref 0–1.2)
BUN SERPL-MCNC: 13 MG/DL (ref 6–20)
BUN/CREAT SERPL: 18 (ref 9–23)
CALCIUM SERPL-MCNC: 9.5 MG/DL (ref 8.7–10.2)
CHLORIDE SERPL-SCNC: 102 MMOL/L (ref 96–106)
CO2 SERPL-SCNC: 22 MMOL/L (ref 20–29)
CREAT SERPL-MCNC: 0.73 MG/DL (ref 0.57–1)
EOSINOPHIL # BLD AUTO: 0.2 X10E3/UL (ref 0–0.4)
EOSINOPHIL NFR BLD AUTO: 3 %
ERYTHROCYTE [DISTWIDTH] IN BLOOD BY AUTOMATED COUNT: 14 % (ref 11.7–15.4)
EST. GFR  (NO RACE VARIABLE): 108 ML/MIN/1.73
GLOBULIN SER CALC-MCNC: 2.6 G/DL (ref 1.5–4.5)
GLUCOSE SERPL-MCNC: 80 MG/DL (ref 65–99)
HCT VFR BLD AUTO: 38.4 % (ref 34–46.6)
HGB BLD-MCNC: 12.2 G/DL (ref 11.1–15.9)
IMM GRANULOCYTES # BLD AUTO: 0 X10E3/UL (ref 0–0.1)
IMM GRANULOCYTES NFR BLD AUTO: 0 %
LYMPHOCYTES # BLD AUTO: 2 X10E3/UL (ref 0.7–3.1)
LYMPHOCYTES NFR BLD AUTO: 36 %
MCH RBC QN AUTO: 29.7 PG (ref 26.6–33)
MCHC RBC AUTO-ENTMCNC: 31.8 G/DL (ref 31.5–35.7)
MCV RBC AUTO: 93 FL (ref 79–97)
MONOCYTES # BLD AUTO: 0.3 X10E3/UL (ref 0.1–0.9)
MONOCYTES NFR BLD AUTO: 6 %
NEUTROPHILS # BLD AUTO: 3.1 X10E3/UL (ref 1.4–7)
NEUTROPHILS NFR BLD AUTO: 55 %
PLATELET # BLD AUTO: 326 X10E3/UL (ref 150–450)
POTASSIUM SERPL-SCNC: 4.5 MMOL/L (ref 3.5–5.2)
PROT SERPL-MCNC: 7.4 G/DL (ref 6–8.5)
RBC # BLD AUTO: 4.11 X10E6/UL (ref 3.77–5.28)
SODIUM SERPL-SCNC: 139 MMOL/L (ref 134–144)
WBC # BLD AUTO: 5.6 X10E3/UL (ref 3.4–10.8)

## 2022-05-01 DIAGNOSIS — K51.00 ULCERATIVE PANCOLITIS: ICD-10-CM

## 2022-05-02 ENCOUNTER — PATIENT MESSAGE (OUTPATIENT)
Dept: GASTROENTEROLOGY | Facility: CLINIC | Age: 37
End: 2022-05-02
Payer: COMMERCIAL

## 2022-05-02 RX ORDER — AZATHIOPRINE 50 MG/1
TABLET ORAL
Qty: 180 TABLET | Refills: 1 | OUTPATIENT
Start: 2022-05-02

## 2022-05-02 NOTE — TELEPHONE ENCOUNTER
----- Message from Ryder Jameson MD sent at 5/1/2022  3:13 PM CDT -----  Pt promised to get labs and has not. She is on azathioprine and simponi and has not had labs since 10/2021 and was due in 1/2022.  Please contact pt and leave her message and send portal message letting her know about f/u appt date/time and I can only give 1 month more until labs are done    Thanks  SS

## 2022-05-03 ENCOUNTER — PATIENT MESSAGE (OUTPATIENT)
Dept: GASTROENTEROLOGY | Facility: CLINIC | Age: 37
End: 2022-05-03
Payer: COMMERCIAL

## 2022-05-04 ENCOUNTER — DOCUMENTATION ONLY (OUTPATIENT)
Dept: GASTROENTEROLOGY | Facility: CLINIC | Age: 37
End: 2022-05-04
Payer: COMMERCIAL

## 2022-05-04 NOTE — PROGRESS NOTES
4/29/2022  Colonoscopy   Normal colonic and terminal ileal mucosa   Right-sided diverticulosis   Internal hemorrhoids     Surgical Pathology   Cecum, Asceding, Transverse, Descending, Sigmoid, Rectum   No evidence of active inflammatory bowel disease, adenomatous change, dysplasia or other significant histopathologic findings.

## 2022-05-05 ENCOUNTER — TELEPHONE (OUTPATIENT)
Dept: GASTROENTEROLOGY | Facility: CLINIC | Age: 37
End: 2022-05-05
Payer: COMMERCIAL

## 2022-05-05 DIAGNOSIS — K51.00 ULCERATIVE PANCOLITIS: Primary | ICD-10-CM

## 2022-05-05 DIAGNOSIS — K51.00 ULCERATIVE PANCOLITIS: ICD-10-CM

## 2022-05-05 RX ORDER — AZATHIOPRINE 50 MG/1
100 TABLET ORAL DAILY
Qty: 180 TABLET | Refills: 0 | Status: SHIPPED | OUTPATIENT
Start: 2022-05-05 | End: 2022-08-03

## 2022-05-05 NOTE — TELEPHONE ENCOUNTER
----- Message from Ryder Jameson MD sent at 5/5/2022  8:07 AM CDT -----  Please coordinate in person appt with me nonurgent    SS

## 2022-07-21 ENCOUNTER — PATIENT MESSAGE (OUTPATIENT)
Dept: GASTROENTEROLOGY | Facility: CLINIC | Age: 37
End: 2022-07-21
Payer: COMMERCIAL

## 2022-07-21 DIAGNOSIS — K51.00 ULCERATIVE PANCOLITIS: Primary | ICD-10-CM

## 2022-07-21 DIAGNOSIS — E53.8 VITAMIN B12 DEFICIENCY: ICD-10-CM

## 2022-08-17 ENCOUNTER — PATIENT MESSAGE (OUTPATIENT)
Dept: GASTROENTEROLOGY | Facility: CLINIC | Age: 37
End: 2022-08-17
Payer: COMMERCIAL

## 2022-08-17 ENCOUNTER — TELEPHONE (OUTPATIENT)
Dept: GASTROENTEROLOGY | Facility: CLINIC | Age: 37
End: 2022-08-17
Payer: COMMERCIAL

## 2022-08-17 DIAGNOSIS — K51.00 ULCERATIVE PANCOLITIS: ICD-10-CM

## 2022-08-17 RX ORDER — AZATHIOPRINE 50 MG/1
100 TABLET ORAL DAILY
Qty: 60 TABLET | Refills: 0 | Status: CANCELLED | OUTPATIENT
Start: 2022-08-17

## 2022-08-18 LAB
25(OH)D3+25(OH)D2 SERPL-MCNC: 26 NG/ML (ref 30–100)
ALBUMIN SERPL-MCNC: 4.6 G/DL (ref 3.8–4.8)
ALBUMIN/GLOB SERPL: 1.7 {RATIO} (ref 1.2–2.2)
ALP SERPL-CCNC: 31 IU/L (ref 44–121)
ALT SERPL-CCNC: 7 IU/L (ref 0–32)
AST SERPL-CCNC: 14 IU/L (ref 0–40)
BASOPHILS # BLD AUTO: 0 X10E3/UL (ref 0–0.2)
BASOPHILS NFR BLD AUTO: 1 %
BILIRUB SERPL-MCNC: 0.2 MG/DL (ref 0–1.2)
BUN SERPL-MCNC: 12 MG/DL (ref 6–20)
BUN/CREAT SERPL: 16 (ref 9–23)
CALCIUM SERPL-MCNC: 9.7 MG/DL (ref 8.7–10.2)
CHLORIDE SERPL-SCNC: 99 MMOL/L (ref 96–106)
CO2 SERPL-SCNC: 26 MMOL/L (ref 20–29)
CREAT SERPL-MCNC: 0.77 MG/DL (ref 0.57–1)
EOSINOPHIL # BLD AUTO: 0.1 X10E3/UL (ref 0–0.4)
EOSINOPHIL NFR BLD AUTO: 2 %
ERYTHROCYTE [DISTWIDTH] IN BLOOD BY AUTOMATED COUNT: 13.8 % (ref 11.7–15.4)
EST. GFR  (NO RACE VARIABLE): 101 ML/MIN/1.73
GAMMA INTERFERON BACKGROUND BLD IA-ACNC: 0.06 IU/ML
GLOBULIN SER CALC-MCNC: 2.7 G/DL (ref 1.5–4.5)
GLUCOSE SERPL-MCNC: 78 MG/DL (ref 65–99)
HBV CORE AB SERPL QL IA: NEGATIVE
HBV SURFACE AG SERPL QL IA: NEGATIVE
HCT VFR BLD AUTO: 39.2 % (ref 34–46.6)
HGB BLD-MCNC: 12.6 G/DL (ref 11.1–15.9)
IMM GRANULOCYTES # BLD AUTO: 0 X10E3/UL (ref 0–0.1)
IMM GRANULOCYTES NFR BLD AUTO: 0 %
LYMPHOCYTES # BLD AUTO: 2 X10E3/UL (ref 0.7–3.1)
LYMPHOCYTES NFR BLD AUTO: 36 %
M TB IFN-G BLD-IMP: NEGATIVE
M TB IFN-G CD4+ BCKGRND COR BLD-ACNC: 0.05 IU/ML
M TB IFN-G CD4+CD8+ BCKGRND COR BLD-ACNC: 0.04 IU/ML
MCH RBC QN AUTO: 29.9 PG (ref 26.6–33)
MCHC RBC AUTO-ENTMCNC: 32.1 G/DL (ref 31.5–35.7)
MCV RBC AUTO: 93 FL (ref 79–97)
MITOGEN IGNF BLD-ACNC: >10 IU/ML
MONOCYTES # BLD AUTO: 0.4 X10E3/UL (ref 0.1–0.9)
MONOCYTES NFR BLD AUTO: 7 %
NEUTROPHILS # BLD AUTO: 3.1 X10E3/UL (ref 1.4–7)
NEUTROPHILS NFR BLD AUTO: 54 %
PLATELET # BLD AUTO: 313 X10E3/UL (ref 150–450)
POTASSIUM SERPL-SCNC: 4.2 MMOL/L (ref 3.5–5.2)
PROT SERPL-MCNC: 7.3 G/DL (ref 6–8.5)
QUANTIFERON TB GOLD (INCUBATED): NORMAL
RBC # BLD AUTO: 4.21 X10E6/UL (ref 3.77–5.28)
SERVICE CMNT-IMP: NORMAL
SODIUM SERPL-SCNC: 139 MMOL/L (ref 134–144)
VIT B12 SERPL-MCNC: 475 PG/ML (ref 232–1245)
WBC # BLD AUTO: 5.6 X10E3/UL (ref 3.4–10.8)

## 2022-08-18 RX ORDER — AZATHIOPRINE 50 MG/1
100 TABLET ORAL DAILY
Qty: 60 TABLET | Refills: 0 | OUTPATIENT
Start: 2022-08-18

## 2022-08-18 NOTE — TELEPHONE ENCOUNTER
[facility]     Collected: 8/12/22    CBC Results  WBC:                5.6  Hgb:                  12.6  Hct:                   39.2   Platelets:           313       CMP/ LFT Results  Na:                  139  K:                     4.2  BUN:                 12  Creatinine:        0.77      Alk Phos:          31 L        Albumin:           4.6  Total Bili:           0.2  AST:                  14    ALT:                     7    Quantiferon TB Gold  Negative     Vitamin D 26.0  L    .0    Vitamin B12      475    HBsAg        Negative     Hep B Core Ab     Negative

## 2022-08-18 NOTE — TELEPHONE ENCOUNTER
Called and spoke to pt  --[facility]   -will call lab to get labs results   -[facility]

## 2022-08-18 NOTE — TELEPHONE ENCOUNTER
Called and spoke to pt   -discuss Dr Jameson message   -pt expressed understanding   -all questions and concerns were answered   -labs were done at [facility] on Friday   -will call lab to get labs results   -needs a updated PA on [facility]

## 2022-08-22 DIAGNOSIS — K51.00 ULCERATIVE PANCOLITIS: ICD-10-CM

## 2022-08-22 NOTE — TELEPHONE ENCOUNTER
Refill request for simponi    Allergies reviewed     Drug Monitoring labs:  CBC and CMP every 6 months  TB Gold, HBsAG and HBcAb yearly       Lab Results   Component Value Date    HEPBSAG Negative 08/12/2022    HEPBCAB Negative 08/12/2022     Lab Results   Component Value Date    TBGOLDPLUS Negative 10/01/2018     Lab Results   Component Value Date    [id number] 26.0 (L) 08/12/2022    [id number] 475 08/12/2022     Lab Results   Component Value Date    WBC 5.6 08/12/2022    HGB 12.6 08/12/2022    HCT 39.2 08/12/2022    MCV 93 08/12/2022     08/12/2022     Lab Results   Component Value Date    CREATININE 0.77 08/12/2022    ALBUMIN 4.6 08/12/2022    BILITOT 0.2 08/12/2022    ALKPHOS 32 (L) 07/03/2020    AST 14 08/12/2022    ALT 7 08/12/2022       Labs due:  2/2022    Next appt: 1/13/2023    RX pended

## 2022-12-02 ENCOUNTER — PATIENT MESSAGE (OUTPATIENT)
Dept: GASTROENTEROLOGY | Facility: CLINIC | Age: 37
End: 2022-12-02
Payer: COMMERCIAL

## 2022-12-04 ENCOUNTER — PATIENT MESSAGE (OUTPATIENT)
Dept: GASTROENTEROLOGY | Facility: CLINIC | Age: 37
End: 2022-12-04
Payer: COMMERCIAL

## 2022-12-04 DIAGNOSIS — K51.00 ULCERATIVE PANCOLITIS: Primary | ICD-10-CM

## 2023-02-09 ENCOUNTER — TELEPHONE (OUTPATIENT)
Dept: GASTROENTEROLOGY | Facility: CLINIC | Age: 38
End: 2023-02-09
Payer: COMMERCIAL

## 2023-02-09 NOTE — TELEPHONE ENCOUNTER
----- Message from Ryder Jameson MD sent at 2/9/2023  3:13 PM CST -----  Please send her portal message to ask her to get labs done and reschedule routine f/u. May/Hannah should be fine.     Let me know if you don't reach her    SS  ----- Message -----  From: Celestina Maloney MA  Sent: 2/9/2023   2:20 PM CST  To: Ryder Jameson MD    Patient cancel f/u appt on 1/13.   Did not get labs done in January

## 2023-02-16 DIAGNOSIS — K51.00 ULCERATIVE PANCOLITIS: ICD-10-CM

## 2023-02-16 NOTE — TELEPHONE ENCOUNTER
Called and spoke to patient   -advise pt we received refill request   -she is over due for labs and f/u appt with Dr Jameson   -pt expressed understanding   -all questions and concerns were answered   -pt stated she will get labs next Friday   -follow up appointment schedule for 5/12/23 at 9:30  -refill request sent to Dr Jameson sh

## 2023-02-16 NOTE — TELEPHONE ENCOUNTER
IBD medications Simponi    Refill request for Simponi    Allergies reviewed Yes    Drug Monitoring labs/frequency for all IBD meds:  CBC and CMP every 6 months  TB Gold, HBsAG and HBcAb yearly       Lab Results   Component Value Date    HEPBSAG Negative 08/12/2022    HEPBCAB Negative 08/12/2022     Lab Results   Component Value Date    TBGOLDPLUS Negative 10/01/2018     Lab Results   Component Value Date    [id number] 26.0 (L) 08/12/2022    [id number] 475 08/12/2022     Lab Results   Component Value Date    WBC 5.6 08/12/2022    HGB 12.6 08/12/2022    HCT 39.2 08/12/2022    MCV 93 08/12/2022     08/12/2022     Lab Results   Component Value Date    CREATININE 0.77 08/12/2022    ALBUMIN 4.6 08/12/2022    BILITOT 0.2 08/12/2022    ALKPHOS 32 (L) 07/03/2020    AST 14 08/12/2022    ALT 7 08/12/2022       Lab due date (mo/yr):  OVERDUE pt plans to get labs on Friday 2/25/23    Labs scheduled: Yes    Next appt: 5/15/2023    RX refill sent to provider for amount until next labs: Yes

## 2023-03-18 LAB
ALBUMIN SERPL-MCNC: 4.7 G/DL (ref 3.8–4.8)
ALBUMIN/GLOB SERPL: 1.5 {RATIO} (ref 1.2–2.2)
ALP SERPL-CCNC: 44 IU/L (ref 44–121)
ALT SERPL-CCNC: 9 IU/L (ref 0–32)
AST SERPL-CCNC: 16 IU/L (ref 0–40)
BASOPHILS # BLD AUTO: 0 X10E3/UL (ref 0–0.2)
BASOPHILS NFR BLD AUTO: 1 %
BILIRUB SERPL-MCNC: 0.3 MG/DL (ref 0–1.2)
BUN SERPL-MCNC: 9 MG/DL (ref 6–20)
BUN/CREAT SERPL: 11 (ref 9–23)
CALCIUM SERPL-MCNC: 9.2 MG/DL (ref 8.7–10.2)
CHLORIDE SERPL-SCNC: 100 MMOL/L (ref 96–106)
CO2 SERPL-SCNC: 25 MMOL/L (ref 20–29)
CREAT SERPL-MCNC: 0.84 MG/DL (ref 0.57–1)
EOSINOPHIL # BLD AUTO: 0.5 X10E3/UL (ref 0–0.4)
EOSINOPHIL NFR BLD AUTO: 10 %
ERYTHROCYTE [DISTWIDTH] IN BLOOD BY AUTOMATED COUNT: 13.6 % (ref 11.7–15.4)
EST. GFR  (NO RACE VARIABLE): 91 ML/MIN/1.73
GLOBULIN SER CALC-MCNC: 3.1 G/DL (ref 1.5–4.5)
GLUCOSE SERPL-MCNC: 74 MG/DL (ref 70–99)
HCT VFR BLD AUTO: 38 % (ref 34–46.6)
HGB BLD-MCNC: 12.7 G/DL (ref 11.1–15.9)
IMM GRANULOCYTES # BLD AUTO: 0 X10E3/UL (ref 0–0.1)
IMM GRANULOCYTES NFR BLD AUTO: 0 %
LYMPHOCYTES # BLD AUTO: 1.7 X10E3/UL (ref 0.7–3.1)
LYMPHOCYTES NFR BLD AUTO: 34 %
MCH RBC QN AUTO: 30.8 PG (ref 26.6–33)
MCHC RBC AUTO-ENTMCNC: 33.4 G/DL (ref 31.5–35.7)
MCV RBC AUTO: 92 FL (ref 79–97)
MONOCYTES # BLD AUTO: 0.3 X10E3/UL (ref 0.1–0.9)
MONOCYTES NFR BLD AUTO: 5 %
MORPHOLOGY BLD-IMP: ABNORMAL
NEUTROPHILS # BLD AUTO: 2.6 X10E3/UL (ref 1.4–7)
NEUTROPHILS NFR BLD AUTO: 50 %
PLATELET # BLD AUTO: 293 X10E3/UL (ref 150–450)
POTASSIUM SERPL-SCNC: 3.9 MMOL/L (ref 3.5–5.2)
PROT SERPL-MCNC: 7.8 G/DL (ref 6–8.5)
RBC # BLD AUTO: 4.12 X10E6/UL (ref 3.77–5.28)
SODIUM SERPL-SCNC: 139 MMOL/L (ref 134–144)
WBC # BLD AUTO: 5.2 X10E3/UL (ref 3.4–10.8)

## 2023-03-24 ENCOUNTER — PATIENT MESSAGE (OUTPATIENT)
Dept: GASTROENTEROLOGY | Facility: CLINIC | Age: 38
End: 2023-03-24
Payer: COMMERCIAL

## 2023-03-27 ENCOUNTER — PATIENT MESSAGE (OUTPATIENT)
Dept: GASTROENTEROLOGY | Facility: CLINIC | Age: 38
End: 2023-03-27
Payer: COMMERCIAL

## 2023-04-11 ENCOUNTER — PATIENT MESSAGE (OUTPATIENT)
Dept: GASTROENTEROLOGY | Facility: CLINIC | Age: 38
End: 2023-04-11
Payer: COMMERCIAL

## 2023-04-28 DIAGNOSIS — K51.00 ULCERATIVE PANCOLITIS: ICD-10-CM

## 2023-04-28 NOTE — TELEPHONE ENCOUNTER
IBD medications Simponi, Imuran, Lialda    Refill request for Simponi    Allergies reviewed Yes    Drug Monitoring labs/frequency for all IBD meds:  CBC/CMP q 3 months; TB & Hep B yearly    Lab Results   Component Value Date    HEPBSAG Negative 08/12/2022    HEPBCAB Negative 08/12/2022     Lab Results   Component Value Date    TBGOLDPLUS Negative 10/01/2018     Lab Results   Component Value Date    [id number] 26.0 (L) 08/12/2022    [id number] 475 08/12/2022     Lab Results   Component Value Date    WBC 5.2 03/17/2023    HGB 12.7 03/17/2023    HCT 38.0 03/17/2023    MCV 92 03/17/2023     03/17/2023     Lab Results   Component Value Date    CREATININE 0.84 03/17/2023    ALBUMIN 4.7 03/17/2023    BILITOT 0.3 03/17/2023    ALKPHOS 32 (L) 07/03/2020    AST 16 03/17/2023    ALT 9 03/17/2023       Lab due date (mo/yr):  6/2023    Labs scheduled: No    Next appt: 5/12/23    RX refill sent to provider for amount until next labs: No- 90 d/s

## 2023-05-12 ENCOUNTER — OFFICE VISIT (OUTPATIENT)
Dept: GASTROENTEROLOGY | Facility: CLINIC | Age: 38
End: 2023-05-12
Payer: COMMERCIAL

## 2023-05-12 ENCOUNTER — CLINICAL SUPPORT (OUTPATIENT)
Dept: INFECTIOUS DISEASES | Facility: CLINIC | Age: 38
End: 2023-05-12
Payer: COMMERCIAL

## 2023-05-12 VITALS
DIASTOLIC BLOOD PRESSURE: 88 MMHG | OXYGEN SATURATION: 100 % | BODY MASS INDEX: 25.09 KG/M2 | TEMPERATURE: 98 F | SYSTOLIC BLOOD PRESSURE: 137 MMHG | WEIGHT: 150.81 LBS

## 2023-05-12 DIAGNOSIS — K51.00 ULCERATIVE PANCOLITIS: ICD-10-CM

## 2023-05-12 DIAGNOSIS — K51.00 ULCERATIVE PANCOLITIS: Primary | ICD-10-CM

## 2023-05-12 DIAGNOSIS — D84.9 IMMUNOSUPPRESSED STATUS: ICD-10-CM

## 2023-05-12 PROCEDURE — 90677 PNEUMOCOCCAL CONJUGATE VACCINE 20-VALENT: ICD-10-PCS | Mod: S$GLB,,, | Performed by: INTERNAL MEDICINE

## 2023-05-12 PROCEDURE — 90471 IMMUNIZATION ADMIN: CPT | Mod: S$GLB,,, | Performed by: INTERNAL MEDICINE

## 2023-05-12 PROCEDURE — 90750 HZV VACC RECOMBINANT IM: CPT | Mod: S$GLB,,, | Performed by: INTERNAL MEDICINE

## 2023-05-12 PROCEDURE — 99214 OFFICE O/P EST MOD 30 MIN: CPT | Mod: S$GLB,,, | Performed by: INTERNAL MEDICINE

## 2023-05-12 PROCEDURE — 90677 PCV20 VACCINE IM: CPT | Mod: S$GLB,,, | Performed by: INTERNAL MEDICINE

## 2023-05-12 PROCEDURE — 90750 ZOSTER RECOMBINANT VACCINE: ICD-10-PCS | Mod: S$GLB,,, | Performed by: INTERNAL MEDICINE

## 2023-05-12 PROCEDURE — 90472 IMMUNIZATION ADMIN EACH ADD: CPT | Mod: S$GLB,,, | Performed by: INTERNAL MEDICINE

## 2023-05-12 PROCEDURE — 99214 PR OFFICE/OUTPT VISIT, EST, LEVL IV, 30-39 MIN: ICD-10-PCS | Mod: S$GLB,,, | Performed by: INTERNAL MEDICINE

## 2023-05-12 PROCEDURE — 90472 ZOSTER RECOMBINANT VACCINE: ICD-10-PCS | Mod: S$GLB,,, | Performed by: INTERNAL MEDICINE

## 2023-05-12 PROCEDURE — 90471 PNEUMOCOCCAL CONJUGATE VACCINE 20-VALENT: ICD-10-PCS | Mod: S$GLB,,, | Performed by: INTERNAL MEDICINE

## 2023-05-12 RX ORDER — AZATHIOPRINE 50 MG/1
100 TABLET ORAL DAILY
Qty: 180 TABLET | Refills: 2 | Status: SHIPPED | OUTPATIENT
Start: 2023-05-12

## 2023-05-12 NOTE — PROGRESS NOTES
Patient received Psygyjp25 and Shingrix IM in left arm.  Patient tolerated well and left clinic NAD after observation.
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
## 2023-05-12 NOTE — PATIENT INSTRUCTIONS
- labs at [facility]- please no later than 7/2023   - pneumonia 20 and shingrix series- schedule today and 2nd shingrix with next visit   - stool calprotectin- [facility]=- turn in when you go for labs in 7/2023  - restart vit D 3 2000 IU/d

## 2023-06-08 ENCOUNTER — PATIENT MESSAGE (OUTPATIENT)
Dept: GASTROENTEROLOGY | Facility: CLINIC | Age: 38
End: 2023-06-08
Payer: COMMERCIAL

## 2023-07-21 DIAGNOSIS — K51.00 ULCERATIVE PANCOLITIS: ICD-10-CM

## 2023-07-21 NOTE — TELEPHONE ENCOUNTER
IBD medications Simponi 100 mg SC q 4 weeks (started 2/2015, LD 7/17, ND 8/14), azathioprine 100 mg po daily (started 2/2015), Lialda 4.8 gm/day    Refill request for Simponi 100 mg    Allergies reviewed Yes    Drug Monitoring labs/frequency for all IBD meds:  CBC, CMP - q 6 months  TB, HEP B - Yearly    Lab Results   Component Value Date    HEPBSAG Negative 08/12/2022    HEPBCAB Negative 08/12/2022     Lab Results   Component Value Date    TBGOLDPLUS Negative 10/01/2018     Lab Results   Component Value Date    KRACSEUU54DA 26.0 (L) 08/12/2022    FWYJUIVK31 475 08/12/2022     Lab Results   Component Value Date    WBC 5.2 03/17/2023    HGB 12.7 03/17/2023    HCT 38.0 03/17/2023    MCV 92 03/17/2023     03/17/2023     Lab Results   Component Value Date    CREATININE 0.84 03/17/2023    ALBUMIN 4.7 03/17/2023    BILITOT 0.3 03/17/2023    ALKPHOS 32 (L) 07/03/2020    AST 16 03/17/2023    ALT 9 03/17/2023       Lab due date (mo/yr):  09/2023    Labs scheduled: No Reminder set for end of august    Next appt: 11/10/2023    RX refill sent to provider for amount until next labs: Yes

## 2023-07-22 ENCOUNTER — PATIENT MESSAGE (OUTPATIENT)
Dept: GASTROENTEROLOGY | Facility: CLINIC | Age: 38
End: 2023-07-22
Payer: COMMERCIAL

## 2023-08-19 LAB
25(OH)D3+25(OH)D2 SERPL-MCNC: 33.2 NG/ML (ref 30–100)
ALBUMIN SERPL-MCNC: 4.9 G/DL (ref 3.9–4.9)
ALBUMIN/GLOB SERPL: 1.9 {RATIO} (ref 1.2–2.2)
ALP SERPL-CCNC: 42 IU/L (ref 44–121)
ALT SERPL-CCNC: 11 IU/L (ref 0–32)
AST SERPL-CCNC: 21 IU/L (ref 0–40)
BASOPHILS # BLD AUTO: 0 X10E3/UL (ref 0–0.2)
BASOPHILS NFR BLD AUTO: 0 %
BILIRUB SERPL-MCNC: 0.4 MG/DL (ref 0–1.2)
BUN SERPL-MCNC: 16 MG/DL (ref 6–20)
BUN/CREAT SERPL: 17 (ref 9–23)
CALCIUM SERPL-MCNC: 9.9 MG/DL (ref 8.7–10.2)
CHLORIDE SERPL-SCNC: 101 MMOL/L (ref 96–106)
CO2 SERPL-SCNC: 24 MMOL/L (ref 20–29)
CREAT SERPL-MCNC: 0.93 MG/DL (ref 0.57–1)
EOSINOPHIL # BLD AUTO: 0.1 X10E3/UL (ref 0–0.4)
EOSINOPHIL NFR BLD AUTO: 2 %
ERYTHROCYTE [DISTWIDTH] IN BLOOD BY AUTOMATED COUNT: 13.8 % (ref 11.7–15.4)
EST. GFR  (NO RACE VARIABLE): 80 ML/MIN/1.73
GAMMA INTERFERON BACKGROUND BLD IA-ACNC: 0.01 IU/ML
GLOBULIN SER CALC-MCNC: 2.6 G/DL (ref 1.5–4.5)
GLUCOSE SERPL-MCNC: 76 MG/DL (ref 70–99)
HBV CORE AB SERPL QL IA: NEGATIVE
HBV SURFACE AG SERPL QL IA: NEGATIVE
HCT VFR BLD AUTO: 38.6 % (ref 34–46.6)
HGB BLD-MCNC: 12.6 G/DL (ref 11.1–15.9)
IMM GRANULOCYTES # BLD AUTO: 0 X10E3/UL (ref 0–0.1)
IMM GRANULOCYTES NFR BLD AUTO: 0 %
LYMPHOCYTES # BLD AUTO: 1.4 X10E3/UL (ref 0.7–3.1)
LYMPHOCYTES NFR BLD AUTO: 27 %
M TB IFN-G BLD-IMP: NEGATIVE
M TB IFN-G CD4+ T-CELLS BLD-ACNC: 0.01 IU/ML
M TBIFN-G CD4+ CD8+T-CELLS BLD-ACNC: 0.01 IU/ML
MCH RBC QN AUTO: 30.3 PG (ref 26.6–33)
MCHC RBC AUTO-ENTMCNC: 32.6 G/DL (ref 31.5–35.7)
MCV RBC AUTO: 93 FL (ref 79–97)
MEV IGG SER IA-ACNC: 163 AU/ML
MITOGEN IGNF BLD-ACNC: >10 IU/ML
MONOCYTES # BLD AUTO: 0.3 X10E3/UL (ref 0.1–0.9)
MONOCYTES NFR BLD AUTO: 6 %
MUV IGG SER IA-ACNC: >300 AU/ML
NEUTROPHILS # BLD AUTO: 3.3 X10E3/UL (ref 1.4–7)
NEUTROPHILS NFR BLD AUTO: 65 %
PLATELET # BLD AUTO: 313 X10E3/UL (ref 150–450)
POTASSIUM SERPL-SCNC: 4.2 MMOL/L (ref 3.5–5.2)
PROT SERPL-MCNC: 7.5 G/DL (ref 6–8.5)
QUANTIFERON TB GOLD (INCUBATED): NORMAL
RBC # BLD AUTO: 4.16 X10E6/UL (ref 3.77–5.28)
RUBV IGG SERPL IA-ACNC: 2.51 INDEX
SERVICE CMNT-IMP: NORMAL
SODIUM SERPL-SCNC: 140 MMOL/L (ref 134–144)
WBC # BLD AUTO: 5.1 X10E3/UL (ref 3.4–10.8)

## 2023-10-05 ENCOUNTER — PATIENT MESSAGE (OUTPATIENT)
Dept: GASTROENTEROLOGY | Facility: CLINIC | Age: 38
End: 2023-10-05
Payer: COMMERCIAL

## 2023-10-12 ENCOUNTER — TELEPHONE (OUTPATIENT)
Dept: GASTROENTEROLOGY | Facility: CLINIC | Age: 38
End: 2023-10-12
Payer: COMMERCIAL

## 2023-10-24 ENCOUNTER — PATIENT MESSAGE (OUTPATIENT)
Dept: INFECTIOUS DISEASES | Facility: CLINIC | Age: 38
End: 2023-10-24
Payer: COMMERCIAL

## 2023-10-25 ENCOUNTER — CLINICAL SUPPORT (OUTPATIENT)
Dept: INFECTIOUS DISEASES | Facility: CLINIC | Age: 38
End: 2023-10-25
Payer: COMMERCIAL

## 2023-10-25 DIAGNOSIS — K51.00 ULCERATIVE PANCOLITIS: ICD-10-CM

## 2023-10-25 PROCEDURE — 90471 IMMUNIZATION ADMIN: CPT | Mod: S$GLB,,, | Performed by: INTERNAL MEDICINE

## 2023-10-25 PROCEDURE — 90471 FLU VACCINE - QUADRIVALENT - HIGH DOSE (65+) PRESERVATIVE FREE IM: ICD-10-PCS | Mod: S$GLB,,, | Performed by: INTERNAL MEDICINE

## 2023-10-25 PROCEDURE — 90750 HZV VACC RECOMBINANT IM: CPT | Mod: S$GLB,,, | Performed by: INTERNAL MEDICINE

## 2023-10-25 PROCEDURE — 90472 ZOSTER RECOMBINANT VACCINE: ICD-10-PCS | Mod: S$GLB,,, | Performed by: INTERNAL MEDICINE

## 2023-10-25 PROCEDURE — 90662 FLU VACCINE - QUADRIVALENT - HIGH DOSE (65+) PRESERVATIVE FREE IM: ICD-10-PCS | Mod: S$GLB,,, | Performed by: INTERNAL MEDICINE

## 2023-10-25 PROCEDURE — 90662 IIV NO PRSV INCREASED AG IM: CPT | Mod: S$GLB,,, | Performed by: INTERNAL MEDICINE

## 2023-10-25 PROCEDURE — 90750 ZOSTER RECOMBINANT VACCINE: ICD-10-PCS | Mod: S$GLB,,, | Performed by: INTERNAL MEDICINE

## 2023-10-25 PROCEDURE — 90472 IMMUNIZATION ADMIN EACH ADD: CPT | Mod: S$GLB,,, | Performed by: INTERNAL MEDICINE

## 2023-10-25 RX ORDER — MESALAMINE 1.2 G/1
TABLET, DELAYED RELEASE ORAL
Qty: 360 TABLET | Refills: 3 | Status: SHIPPED | OUTPATIENT
Start: 2023-10-25

## 2023-10-25 NOTE — PROGRESS NOTES
Patient received the Shingrix #2 vaccine in the left deltoid, and the HD flu vaccine in the right deltoid. Pt tolerated well. Pt asked to wait in the clinic 15 minutes after injection in the event of an allergic reaction. Pt verbalized understanding. Pt left in NAD.

## 2023-10-25 NOTE — TELEPHONE ENCOUNTER
IBD medications Simponi 100 mg SC q 4 weeks, azathioprine 100 mg po daily, Lialda 4.8 gm/day    Refill request for Lialda 4.8 gm    Allergies reviewed Yes    Drug Monitoring labs/frequency for all IBD meds:  CBC, CMP - q 3 months  TB, HEP B - Yearly    Lab Results   Component Value Date    HEPBSAG Negative 08/17/2023    HEPBCAB Negative 08/17/2023     Lab Results   Component Value Date    TBGOLDPLUS Negative 10/01/2018        Quantiferon Gold TB - 08/17/2023  Lab Results   Component Value Date    OLVBJDRR91TU 33.2 08/17/2023    UREUSBEB28 475 08/12/2022     Lab Results   Component Value Date    WBC 5.1 08/17/2023    HGB 12.6 08/17/2023    HCT 38.6 08/17/2023    MCV 93 08/17/2023     08/17/2023     Lab Results   Component Value Date    CREATININE 0.93 08/17/2023    ALBUMIN 4.9 08/17/2023    BILITOT 0.4 08/17/2023    ALKPHOS 32 (L) 07/03/2020    AST 21 08/17/2023    ALT 11 08/17/2023       Lab due date (mo/yr):  11/2023    Labs scheduled: No    Next appt: 11/10/2023    RX refill sent to provider for amount until next labs: Yes

## 2023-11-08 ENCOUNTER — PATIENT MESSAGE (OUTPATIENT)
Dept: GASTROENTEROLOGY | Facility: CLINIC | Age: 38
End: 2023-11-08
Payer: COMMERCIAL

## 2023-11-10 ENCOUNTER — OFFICE VISIT (OUTPATIENT)
Dept: GASTROENTEROLOGY | Facility: CLINIC | Age: 38
End: 2023-11-10
Payer: COMMERCIAL

## 2023-11-10 ENCOUNTER — TELEPHONE (OUTPATIENT)
Dept: GASTROENTEROLOGY | Facility: CLINIC | Age: 38
End: 2023-11-10
Payer: COMMERCIAL

## 2023-11-10 DIAGNOSIS — D84.9 IMMUNOSUPPRESSED STATUS: ICD-10-CM

## 2023-11-10 DIAGNOSIS — K51.00 ULCERATIVE PANCOLITIS: Primary | ICD-10-CM

## 2023-11-10 PROCEDURE — 99215 OFFICE O/P EST HI 40 MIN: CPT | Mod: 95,,, | Performed by: INTERNAL MEDICINE

## 2023-11-10 PROCEDURE — 99215 PR OFFICE/OUTPT VISIT, EST, LEVL V, 40-54 MIN: ICD-10-PCS | Mod: 95,,, | Performed by: INTERNAL MEDICINE
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
## 2023-11-10 NOTE — PATIENT INSTRUCTIONS
- labs at [facility] 12/2023  - drug levels to be coordinated on 12/4 or 12/5 or 1/2  - Dr. Welsh- EJ- you will be due for a colonoscopy 4/2024  - pap smear yearly  - skin exam yearly   - restart vit D3 2000 IU/d

## 2023-11-28 ENCOUNTER — PATIENT MESSAGE (OUTPATIENT)
Dept: GASTROENTEROLOGY | Facility: CLINIC | Age: 38
End: 2023-11-28
Payer: COMMERCIAL

## 2023-11-28 DIAGNOSIS — K51.00 ULCERATIVE PANCOLITIS: ICD-10-CM

## 2023-11-28 NOTE — TELEPHONE ENCOUNTER
IBD medications  Simponi 100 mg SC q 4 weeks,  Lialda 4.8 g/day,   mg/d     Refill request for  Simponi 100 mg    Allergies reviewed Yes    Drug Monitoring labs/frequency for all IBD meds:  CBC, CMP - q 3 months  TB, HEP B - Yearly    Lab Results   Component Value Date    HEPBSAG Negative 08/17/2023    HEPBCAB Negative 08/17/2023     Lab Results   Component Value Date    TBGOLDPLUS Negative 10/01/2018     Lab Results   Component Value Date    QUANTNILVALU 0.01 08/17/2023    QUANTTBGDPL Negative 08/17/2023      Lab Results   Component Value Date    FHHHCSDF89ZH 33.2 08/17/2023    [id number] 475 08/12/2022     Lab Results   Component Value Date    WBC 5.1 08/17/2023    HGB 12.6 08/17/2023    HCT 38.6 08/17/2023    MCV 93 08/17/2023     08/17/2023     Lab Results   Component Value Date    CREATININE 0.93 08/17/2023    ALBUMIN 4.9 08/17/2023    BILITOT 0.4 08/17/2023    ALKPHOS 32 (L) 07/03/2020    AST 21 08/17/2023    ALT 11 08/17/2023       Lab due date (mo/yr):  11/2023    Labs scheduled: No    Next appt: 06/19/2024    RX refill sent to provider for amount until next labs: Yes

## 2023-11-29 ENCOUNTER — TELEPHONE (OUTPATIENT)
Dept: GASTROENTEROLOGY | Facility: CLINIC | Age: 38
End: 2023-11-29
Payer: COMMERCIAL

## 2023-11-29 NOTE — TELEPHONE ENCOUNTER
Supporting documents (clinical notes, labs, etc) faxed to [facility]. Successful fax transmittal e-mail received.     Infusion center:   Fax # [phone]

## 2023-12-05 ENCOUNTER — TELEPHONE (OUTPATIENT)
Dept: GASTROENTEROLOGY | Facility: CLINIC | Age: 38
End: 2023-12-05
Payer: COMMERCIAL

## 2023-12-05 ENCOUNTER — LAB VISIT (OUTPATIENT)
Dept: LAB | Facility: HOSPITAL | Age: 38
End: 2023-12-05
Attending: INTERNAL MEDICINE
Payer: COMMERCIAL

## 2023-12-05 ENCOUNTER — PATIENT MESSAGE (OUTPATIENT)
Dept: GASTROENTEROLOGY | Facility: CLINIC | Age: 38
End: 2023-12-05
Payer: COMMERCIAL

## 2023-12-05 DIAGNOSIS — D84.9 IMMUNOSUPPRESSED STATUS: ICD-10-CM

## 2023-12-05 DIAGNOSIS — K51.00 ULCERATIVE PANCOLITIS: ICD-10-CM

## 2023-12-05 PROCEDURE — 36415 COLL VENOUS BLD VENIPUNCTURE: CPT | Performed by: INTERNAL MEDICINE

## 2023-12-05 PROCEDURE — 80299 QUANTITATIVE ASSAY DRUG: CPT | Mod: 91 | Performed by: INTERNAL MEDICINE

## 2023-12-05 PROCEDURE — 80299 QUANTITATIVE ASSAY DRUG: CPT | Performed by: INTERNAL MEDICINE

## 2023-12-05 NOTE — TELEPHONE ENCOUNTER
"----- Message from Chana Aguilera RN sent at 12/5/2023  1:16 PM CST -----  Regarding: FW: pt advice  Contact: [phone]    ----- Message -----  From: Dana Dhaliwal  Sent: 12/5/2023   9:45 AM CST  To: Gisell Soler Staff  Subject: pt advice                                        Name Of Caller: Audrey     Contact Preference?: [phone]     What is the nature of the call?: following up on an message that was sent that the office need to reach out to the PBM for prior auth for golimumab (SIMPONI) 100 mg/mL PnIj since it's delayed     Additional Notes:    "Thank you for all that you do for our patients"          "

## 2023-12-05 NOTE — TELEPHONE ENCOUNTER
Called insurance and spoke with Terrell  - unable to submit appeal verbally  - advised to submit urgent reconsideration  - appeal can take up to 30 days.  - urgent reconsideration and supporting docs faxed to [phone]

## 2023-12-07 LAB
6-TGN ENTSUB RBC: 157 PMOL/8X10(8)RBC (ref 235–450)
6MMP ENTSUB RBC: 1136 PMOL/8X10(8)RBC

## 2023-12-11 ENCOUNTER — TELEPHONE (OUTPATIENT)
Dept: GASTROENTEROLOGY | Facility: CLINIC | Age: 38
End: 2023-12-11
Payer: COMMERCIAL

## 2023-12-11 NOTE — TELEPHONE ENCOUNTER
Letter faxed to [employer]. Successful fax transmittal e-mail received.     [employer]   Fax # [phone]

## 2023-12-12 ENCOUNTER — PATIENT MESSAGE (OUTPATIENT)
Dept: GASTROENTEROLOGY | Facility: CLINIC | Age: 38
End: 2023-12-12
Payer: COMMERCIAL

## 2023-12-12 NOTE — TELEPHONE ENCOUNTER
Per PM from Emily,     Reason for call: painful AM BMs    Current IBD meds:  - Hung, LD: 11/7, ND: due 12/5 - Ayla working on getting samples    Drug levels: in process, Lab system is currently down, will check back in a bit    Next clinic visit: 6/19/24 SS      Symptoms    Total trips to the toilet and how many are bowel movements (24 hour period): 5-6    Tenesmus (false trips to toilet with blood/mucus and no BMs): 1-2    Consistency of stool (liquid, applesauce like, soft, formed): soft    Blood in stool (% or # of stool with blood): no    Mucous: no    # Nocturnal BMS: no    Fevers or chills: no    Pain and if so where and rate pain (intermittent or constant, description-sharp/stabbing/squeezing, location, radiation, intensity, aggravating or relieving factors):    - 2-3 / 10   - LLQ   - uncomfortable like bloating, turns to cramping during BM   - meat, dairy, and ETOH exacerbate sxs    Pt was instructed to turn in stool marcus 07/2023, was not turned in to [facility].

## 2023-12-14 ENCOUNTER — TELEPHONE (OUTPATIENT)
Dept: GASTROENTEROLOGY | Facility: CLINIC | Age: 38
End: 2023-12-14
Payer: COMMERCIAL

## 2023-12-14 NOTE — TELEPHONE ENCOUNTER
----- Message from Maria Fernanda Perales RN sent at 12/14/2023  1:05 PM CST -----    ----- Message -----  From: Zaira Nixon  Sent: 12/14/2023  12:59 PM CST  To: Gisell Soler Staff    Sara conde/ [employer] calling regarding wanting to inform of the review decision about the medication golimumab (SIMPONI) 100 mg/mL PnIj.. The Denial was held and the doctor will get a copy of the report sent in a priority express mail

## 2023-12-18 LAB
GOLIMUMAB AB SERPL IA-MCNC: <20 NG/ML
GOLIMUMAB SERPL IA-MCNC: 0.9 UG/ML

## 2023-12-19 ENCOUNTER — TELEPHONE (OUTPATIENT)
Dept: GASTROENTEROLOGY | Facility: CLINIC | Age: 38
End: 2023-12-19
Payer: COMMERCIAL

## 2023-12-27 ENCOUNTER — PATIENT MESSAGE (OUTPATIENT)
Dept: GASTROENTEROLOGY | Facility: CLINIC | Age: 38
End: 2023-12-27
Payer: COMMERCIAL

## 2023-12-28 LAB
ALBUMIN SERPL-MCNC: 4.6 G/DL (ref 3.9–4.9)
ALBUMIN/GLOB SERPL: 1.8 {RATIO} (ref 1.2–2.2)
ALP SERPL-CCNC: 44 IU/L (ref 44–121)
ALT SERPL-CCNC: 11 IU/L (ref 0–32)
AST SERPL-CCNC: 14 IU/L (ref 0–40)
BASOPHILS # BLD AUTO: 0 X10E3/UL (ref 0–0.2)
BASOPHILS NFR BLD AUTO: 1 %
BILIRUB SERPL-MCNC: <0.2 MG/DL (ref 0–1.2)
BUN SERPL-MCNC: 11 MG/DL (ref 6–20)
BUN/CREAT SERPL: 13 (ref 9–23)
CALCIUM SERPL-MCNC: 9.4 MG/DL (ref 8.7–10.2)
CHLORIDE SERPL-SCNC: 101 MMOL/L (ref 96–106)
CO2 SERPL-SCNC: 23 MMOL/L (ref 20–29)
CREAT SERPL-MCNC: 0.84 MG/DL (ref 0.57–1)
EOSINOPHIL # BLD AUTO: 0.5 X10E3/UL (ref 0–0.4)
EOSINOPHIL NFR BLD AUTO: 9 %
ERYTHROCYTE [DISTWIDTH] IN BLOOD BY AUTOMATED COUNT: 13.5 % (ref 11.7–15.4)
EST. GFR  (NO RACE VARIABLE): 91 ML/MIN/1.73
GLOBULIN SER CALC-MCNC: 2.6 G/DL (ref 1.5–4.5)
GLUCOSE SERPL-MCNC: 92 MG/DL (ref 70–99)
HBV CORE AB SERPL QL IA: NEGATIVE
HBV SURFACE AG SERPL QL IA: NEGATIVE
HCT VFR BLD AUTO: 40.6 % (ref 34–46.6)
HGB BLD-MCNC: 13 G/DL (ref 11.1–15.9)
IMM GRANULOCYTES # BLD AUTO: 0 X10E3/UL (ref 0–0.1)
IMM GRANULOCYTES NFR BLD AUTO: 0 %
LYMPHOCYTES # BLD AUTO: 1.7 X10E3/UL (ref 0.7–3.1)
LYMPHOCYTES NFR BLD AUTO: 32 %
MCH RBC QN AUTO: 30.5 PG (ref 26.6–33)
MCHC RBC AUTO-ENTMCNC: 32 G/DL (ref 31.5–35.7)
MCV RBC AUTO: 95 FL (ref 79–97)
MONOCYTES # BLD AUTO: 0.5 X10E3/UL (ref 0.1–0.9)
MONOCYTES NFR BLD AUTO: 9 %
NEUTROPHILS # BLD AUTO: 2.5 X10E3/UL (ref 1.4–7)
NEUTROPHILS NFR BLD AUTO: 49 %
PLATELET # BLD AUTO: 373 X10E3/UL (ref 150–450)
POTASSIUM SERPL-SCNC: 4.6 MMOL/L (ref 3.5–5.2)
PROT SERPL-MCNC: 7.2 G/DL (ref 6–8.5)
RBC # BLD AUTO: 4.26 X10E6/UL (ref 3.77–5.28)
SODIUM SERPL-SCNC: 139 MMOL/L (ref 134–144)
WBC # BLD AUTO: 5.1 X10E3/UL (ref 3.4–10.8)

## 2024-01-02 ENCOUNTER — PATIENT MESSAGE (OUTPATIENT)
Dept: GASTROENTEROLOGY | Facility: CLINIC | Age: 39
End: 2024-01-02
Payer: COMMERCIAL

## 2024-01-02 DIAGNOSIS — R19.7 DIARRHEA, UNSPECIFIED TYPE: Primary | ICD-10-CM

## 2024-01-02 DIAGNOSIS — K51.00 ULCERATIVE PANCOLITIS: ICD-10-CM

## 2024-01-04 LAB — CALPROTECTIN STL-MCNT: 349 UG/G (ref 0–120)

## 2024-01-05 ENCOUNTER — TELEPHONE (OUTPATIENT)
Dept: GASTROENTEROLOGY | Facility: CLINIC | Age: 39
End: 2024-01-05
Payer: COMMERCIAL

## 2024-01-05 NOTE — TELEPHONE ENCOUNTER
Called patient and no answer, left message.   Concerned about her not starting something for diarrhea prior to the weekend.    Stool cx and C diff to be turned in and pt has orders   Stool calprotectin elevated.

## 2024-01-05 NOTE — TELEPHONE ENCOUNTER
----- Message from Lukasz Sotomayor sent at 1/5/2024 10:51 AM CST -----  Regarding: Pt advice  Contact: [phone]  Pt returning callback from missed call. Requesting to speak with somebody in    office. Please call.

## 2024-01-09 ENCOUNTER — TELEPHONE (OUTPATIENT)
Dept: GASTROENTEROLOGY | Facility: CLINIC | Age: 39
End: 2024-01-09
Payer: COMMERCIAL

## 2024-01-09 ENCOUNTER — PATIENT MESSAGE (OUTPATIENT)
Dept: GASTROENTEROLOGY | Facility: CLINIC | Age: 39
End: 2024-01-09
Payer: COMMERCIAL

## 2024-01-09 RX ORDER — PREDNISONE 10 MG/1
40 TABLET ORAL DAILY
Qty: 120 TABLET | Refills: 0 | Status: SHIPPED | OUTPATIENT
Start: 2024-01-09 | End: 2024-02-05

## 2024-01-10 ENCOUNTER — OFFICE VISIT (OUTPATIENT)
Dept: GASTROENTEROLOGY | Facility: CLINIC | Age: 39
End: 2024-01-10
Payer: COMMERCIAL

## 2024-01-10 ENCOUNTER — TELEPHONE (OUTPATIENT)
Dept: GASTROENTEROLOGY | Facility: CLINIC | Age: 39
End: 2024-01-10
Payer: COMMERCIAL

## 2024-01-10 VITALS
HEIGHT: 64 IN | OXYGEN SATURATION: 100 % | WEIGHT: 151 LBS | DIASTOLIC BLOOD PRESSURE: 81 MMHG | BODY MASS INDEX: 25.78 KG/M2 | TEMPERATURE: 98 F | SYSTOLIC BLOOD PRESSURE: 128 MMHG | HEART RATE: 68 BPM

## 2024-01-10 DIAGNOSIS — Z79.899 IMMUNODEFICIENCY DUE TO LONG TERM IMMUNOSUPPRESSIVE DRUG THERAPY: ICD-10-CM

## 2024-01-10 DIAGNOSIS — M31.0 LEUKOCYTOCLASTIC VASCULITIS: ICD-10-CM

## 2024-01-10 DIAGNOSIS — K51.00 ULCERATIVE PANCOLITIS: Primary | ICD-10-CM

## 2024-01-10 DIAGNOSIS — D84.821 IMMUNODEFICIENCY DUE TO LONG TERM IMMUNOSUPPRESSIVE DRUG THERAPY: ICD-10-CM

## 2024-01-10 DIAGNOSIS — K21.9 GASTROESOPHAGEAL REFLUX DISEASE WITHOUT ESOPHAGITIS: ICD-10-CM

## 2024-01-10 DIAGNOSIS — T45.1X5A IMMUNODEFICIENCY DUE TO LONG TERM IMMUNOSUPPRESSIVE DRUG THERAPY: ICD-10-CM

## 2024-01-10 PROCEDURE — 99215 OFFICE O/P EST HI 40 MIN: CPT | Mod: S$GLB,,, | Performed by: INTERNAL MEDICINE

## 2024-01-10 NOTE — TELEPHONE ENCOUNTER
----- Message from Maria Fernanda Perales RN sent at 1/10/2024 10:32 AM CST -----  - stool culture results- call [facility] today and see if resulted
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
## 2024-01-10 NOTE — PATIENT INSTRUCTIONS
- continue prednisone 40 mg daily - take prednisone at lunch today, then switch to morning starting tomorrow  - will start auth for entyvio  - will f/u on stool culture results   - unsedated flex sig in 2 weeks if symptoms dont' improve- will need central pricing information  - stop simponi- no longer covered by insurance  - continue azathioprine 100 mg daily  - repeat stool calprotectin in 1 month

## 2024-01-11 ENCOUNTER — TELEPHONE (OUTPATIENT)
Dept: ENDOSCOPY | Facility: HOSPITAL | Age: 39
End: 2024-01-11
Payer: COMMERCIAL

## 2024-01-11 PROBLEM — Z79.899 IMMUNODEFICIENCY DUE TO LONG TERM IMMUNOSUPPRESSIVE DRUG THERAPY: Status: ACTIVE | Noted: 2024-01-11

## 2024-01-11 PROBLEM — K21.9 GERD (GASTROESOPHAGEAL REFLUX DISEASE): Status: ACTIVE | Noted: 2024-01-11

## 2024-01-11 PROBLEM — M31.0 LEUKOCYTOCLASTIC VASCULITIS: Status: ACTIVE | Noted: 2024-01-11

## 2024-01-11 PROBLEM — T45.1X5A IMMUNODEFICIENCY DUE TO LONG TERM IMMUNOSUPPRESSIVE DRUG THERAPY: Status: ACTIVE | Noted: 2024-01-11

## 2024-01-11 PROBLEM — D84.821 IMMUNODEFICIENCY DUE TO LONG TERM IMMUNOSUPPRESSIVE DRUG THERAPY: Status: ACTIVE | Noted: 2024-01-11

## 2024-01-11 RX ORDER — VEDOLIZUMAB 108 MG/.68ML
108 INJECTION, SOLUTION SUBCUTANEOUS
Qty: 1.36 ML | Refills: 5 | Status: ACTIVE | OUTPATIENT
Start: 2024-01-11

## 2024-01-11 NOTE — TELEPHONE ENCOUNTER
Called & spoke to Erin conde/ [facility]  - Stool culture still in process & expected to result by 1/13/24  - Will update Dr. Jameson

## 2024-01-11 NOTE — TELEPHONE ENCOUNTER
"Contacted the patient to schedule an endoscopy procedure(s) Flex Sig. The patient did not answer the call and left a voice message requesting a call back.        --- Message -----   From: Maria Fernanda Perales RN   Sent: 1/10/2024  10:24 AM CST   To: Gisell Soler Staff; *     Procedure: Unsedated Flex Sig     Diagnosis: Ulcerative Colitis     Procedure Timin weeks; urgent slot     *If within 4 weeks selected, please massimo as high priority*     *If greater than 12 weeks, please select "5-12 weeks" and delay sending until 3 months prior to requested date*     Provider: Dr. BRODERICK Jameson     Location: [address]     Additional Scheduling Information: No scheduling concerns     Prep Specifications:Standard prep     Is the patient taking a GLP-1 Agonist:no     Have you attached a patient to this message: yes           "

## 2024-01-13 LAB
BACTERIA SPEC CULT: NORMAL
BACTERIA SPEC CULT: NORMAL
C DIFF TOX A+B STL QL IA: NEGATIVE
CAMPYLOBACTER STL CULT: NORMAL
E COLI SXT STL QL IA: NEGATIVE
SALM + SHIG STL CULT: NORMAL

## 2024-01-16 ENCOUNTER — TELEPHONE (OUTPATIENT)
Dept: GASTROENTEROLOGY | Facility: CLINIC | Age: 39
End: 2024-01-16
Payer: COMMERCIAL

## 2024-01-16 ENCOUNTER — TELEPHONE (OUTPATIENT)
Dept: ENDOSCOPY | Facility: HOSPITAL | Age: 39
End: 2024-01-16
Payer: COMMERCIAL

## 2024-01-16 NOTE — TELEPHONE ENCOUNTER
Contacted the patient to schedule an endoscopy procedure(s) Flex Sig. The patient did not answer the call and left a voice message requesting a call back.

## 2024-01-16 NOTE — TELEPHONE ENCOUNTER
----- Message from Ryder Jameson MD sent at 1/16/2024 10:25 AM CST -----  Please f/u on [facility] stool culture results- should be resulted     SS

## 2024-01-18 ENCOUNTER — TELEPHONE (OUTPATIENT)
Dept: ENDOSCOPY | Facility: HOSPITAL | Age: 39
End: 2024-01-18
Payer: COMMERCIAL

## 2024-01-18 NOTE — TELEPHONE ENCOUNTER
Please cancel request for flex sig on this pt. No longer needed per Dr. Jameson.     Thanks,   Chana

## 2024-01-18 NOTE — TELEPHONE ENCOUNTER
Contacted the patient to schedule an endoscopy procedure(s) Flex Sig. The patient did not answer the call and left a voice message requesting a call back.      Held slot on 1/29 with CLEOPATRA Jameson on 4th floor.

## 2024-01-22 ENCOUNTER — PATIENT MESSAGE (OUTPATIENT)
Dept: GASTROENTEROLOGY | Facility: CLINIC | Age: 39
End: 2024-01-22
Payer: COMMERCIAL

## 2024-01-23 NOTE — TELEPHONE ENCOUNTER
Per PM:  IBD meds:  - Lialda 4.8 G/d  - Imuran 100mg/d    - Prednisone 30mg (started 40mg/d 1/9, 30mg 1/18)  - 2-3 BM/d, formed, brown  - unsure if mucous present  - denies blood, noc BM, false trips, and pain only cramping w/BM that resolves after defecation  - Pt feels better compared to a week ago    Dr. Jameson will be updated for recommendation.

## 2024-01-24 ENCOUNTER — PATIENT MESSAGE (OUTPATIENT)
Dept: GASTROENTEROLOGY | Facility: CLINIC | Age: 39
End: 2024-01-24
Payer: COMMERCIAL

## 2024-01-24 ENCOUNTER — TELEPHONE (OUTPATIENT)
Dept: GASTROENTEROLOGY | Facility: CLINIC | Age: 39
End: 2024-01-24
Payer: COMMERCIAL

## 2024-01-30 ENCOUNTER — TELEPHONE (OUTPATIENT)
Dept: GASTROENTEROLOGY | Facility: CLINIC | Age: 39
End: 2024-01-30
Payer: COMMERCIAL

## 2024-01-30 NOTE — TELEPHONE ENCOUNTER
Pred taper, on 30mg/d   (Started 40mg/d 1/9, 30mg/d 1/18, 20mg/d  1/24)    IBD meds:   - Entyvio induction 2/10  - Imuran 100mg/d   - Lialda 4.8 G/d

## 2024-02-05 RX ORDER — PREDNISONE 10 MG/1
20 TABLET ORAL DAILY
Qty: 60 TABLET | Refills: 0 | Status: SHIPPED | OUTPATIENT
Start: 2024-02-05 | End: 2024-06-10

## 2024-02-05 NOTE — TELEPHONE ENCOUNTER
IBD medications  Simponi 100 mg SC q 4 weeks,  mg/d, Lialda 4.8 g/day, prednisone 40 mg/d     Refill request for prednisone 40 mg    Allergies reviewed Yes    Drug Monitoring labs/frequency for all IBD meds:  CBC, CMP - q 3 months  TB, HEP B - Yearly      Lab Results   Component Value Date    HEPBSAG Negative 12/27/2023    HEPBCAB Negative 12/27/2023     Lab Results   Component Value Date    TBGOLDPLUS Negative 10/01/2018     Lab Results   Component Value Date    QUANTNILVALU 0.01 08/17/2023    QUANTTBGDPL Negative 08/17/2023      Lab Results   Component Value Date    JYCTLYUE75MG 33.2 08/17/2023    [id number] 475 08/12/2022     Lab Results   Component Value Date    WBC 5.1 12/27/2023    HGB 13.0 12/27/2023    HCT 40.6 12/27/2023    MCV 95 12/27/2023     12/27/2023     Lab Results   Component Value Date    CREATININE 0.84 12/27/2023    ALBUMIN 4.6 12/27/2023    BILITOT <0.2 12/27/2023    ALKPHOS 32 (L) 07/03/2020    AST 14 12/27/2023    ALT 11 12/27/2023       Lab due date (mo/yr):  03/2024    Labs scheduled: no     Next appt: 02/28/2024    RX refill sent to provider for amount until next labs: Yes

## 2024-02-06 ENCOUNTER — TELEPHONE (OUTPATIENT)
Dept: GASTROENTEROLOGY | Facility: CLINIC | Age: 39
End: 2024-02-06
Payer: COMMERCIAL

## 2024-02-06 NOTE — TELEPHONE ENCOUNTER
Per Dr. Jameson:  - reduce prednisone to 15mg/d  - recheck 1 wk  - review 2nd appeal for Entyvio pen

## 2024-02-06 NOTE — TELEPHONE ENCOUNTER
PM from patient sent on previous message thread:    Hello! Have been doing well on the 20mg prednisone daily 1-2 formed BM per day no blood minimal pain during BM and no pain at other times. Have been on 20 daily since Wed 1/24 except for Wed 1/31 when went down to 10 and symptoms worsened the next day so we went back up to 20 again. Entyvio infusion #1 scheduled Saturday 2/10. Going out of town for a conference 2/11-2/18. Infusion #2 scheduled Wed 2/21. Going out of town again evening flight after the infusion  2/21-2/25. What is the plan regarding steroids and the infusion over the coming weeks? Any update on insurance approval for subcutaneous injections? Thank you.

## 2024-02-09 NOTE — TELEPHONE ENCOUNTER
Entyvio 300mg IV starts on 2/10/23, #2 on 2/21 (3 days early due to travel plans), #3 on 3/23  Entyvio 108 mg SC every 2 weeks to start on 5/18/23

## 2024-02-12 ENCOUNTER — TELEPHONE (OUTPATIENT)
Dept: GASTROENTEROLOGY | Facility: CLINIC | Age: 39
End: 2024-02-12
Payer: COMMERCIAL

## 2024-02-12 NOTE — TELEPHONE ENCOUNTER
Pred taper, on 15 mg/d   (Started 40mg/d 1/9, 30mg/d 1/18, 20mg/d  1/24, 10mg 1/30, 20mg 2/1, 15mg 2/8)     IBD meds:   - Entyvio induction 2/10   - Imuran 100mg/d   - Lialda 4.8 G/d

## 2024-02-27 ENCOUNTER — TELEPHONE (OUTPATIENT)
Dept: GASTROENTEROLOGY | Facility: CLINIC | Age: 39
End: 2024-02-27
Payer: COMMERCIAL

## 2024-02-27 NOTE — TELEPHONE ENCOUNTER
Called and spoke with Patty  - confirmed NKDA  - confirmed ICD code   - provided insurance information  - no further action needed from [facility]

## 2024-02-27 NOTE — TELEPHONE ENCOUNTER
----- Message from Chana Aguilera RN sent at 2/26/2024 11:31 AM CST -----  Regarding: FW: pt advice    ----- Message -----  From: Mary Kate Mayen  Sent: 2/26/2024  11:05 AM CST  To: Gisell Soler Staff  Subject: pt advice                                        Dana welsh [facility] calling to get ICD -10 code and drug allergy. Is the patient new to the medication. Pls call to further clarify.     [phone]    Fax [phone]

## 2024-02-28 ENCOUNTER — TELEPHONE (OUTPATIENT)
Dept: GASTROENTEROLOGY | Facility: CLINIC | Age: 39
End: 2024-02-28
Payer: COMMERCIAL

## 2024-02-28 ENCOUNTER — OFFICE VISIT (OUTPATIENT)
Dept: GASTROENTEROLOGY | Facility: CLINIC | Age: 39
End: 2024-02-28
Payer: COMMERCIAL

## 2024-02-28 VITALS — BODY MASS INDEX: 25.61 KG/M2 | WEIGHT: 150 LBS | HEIGHT: 64 IN

## 2024-02-28 DIAGNOSIS — K51.00 ULCERATIVE PANCOLITIS: Primary | ICD-10-CM

## 2024-02-28 PROCEDURE — 99215 OFFICE O/P EST HI 40 MIN: CPT | Mod: 95,,, | Performed by: INTERNAL MEDICINE

## 2024-02-28 PROCEDURE — G2211 COMPLEX E/M VISIT ADD ON: HCPCS | Mod: 95,,, | Performed by: INTERNAL MEDICINE

## 2024-02-28 RX ORDER — VEDOLIZUMAB 300 MG/5ML
300 INJECTION, POWDER, LYOPHILIZED, FOR SOLUTION INTRAVENOUS
COMMUNITY
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
## 2024-02-28 NOTE — PATIENT INSTRUCTIONS
- your next entyvio infusion 3/23 and first injection will be 5/18, 6/1, 6/15  - stop prednisone with last dose on 2/29  - stool calprotectin- within next week  - pharmacy visit - for injection training   - colonoscopy- 8/2024  [facility]  - [facility]- call and schedule delivery of entyvio injection- [phone]

## 2024-02-28 NOTE — Clinical Note
FYI-- pt needs pharmacy visit for entyvio injection training and will email to get that set up when she is ready

## 2024-03-19 LAB — CALPROTECTIN STL-MCNT: 57 UG/G (ref 0–120)

## 2024-03-21 ENCOUNTER — TELEPHONE (OUTPATIENT)
Dept: GASTROENTEROLOGY | Facility: CLINIC | Age: 39
End: 2024-03-21
Payer: COMMERCIAL

## 2024-03-21 DIAGNOSIS — K51.00 ULCERATIVE PANCOLITIS: Primary | ICD-10-CM

## 2024-03-21 RX ORDER — VEDOLIZUMAB 108 MG/.68ML
108 INJECTION, SOLUTION SUBCUTANEOUS
Qty: 1.36 ML | Refills: 5 | Status: SHIPPED | OUTPATIENT
Start: 2024-03-21 | End: 2024-03-21 | Stop reason: SDUPTHER

## 2024-03-21 NOTE — TELEPHONE ENCOUNTER
Received fax from [facility]  ([place]) stating that Entyvio pen Rx needs to be filled by [facility].  Called [facility] ([phone]) rep Nadeen ARIAS confirmed that patient does need to fill Entyvio pen at [facility]  ([place]). [employer] bought out [facility], and erroneous faxes have been sent out from [facility] [place] location.    Called [facility]  ([place]) to clarify order status- no answer. LVM.

## 2024-03-21 NOTE — TELEPHONE ENCOUNTER
Incoming call- Spoke with Maryjo Phoebe ([facility] rep) called back stating [facility] was [facility] [place] [facility].  Rep unable to explain fax.  She will reach out to [employer] to to clarify and call back

## 2024-03-21 NOTE — TELEPHONE ENCOUNTER
----- Message from Ayla Whitehead PharmD sent at 3/21/2024  1:47 PM CDT -----  Regarding: FW: refill  Contact: Bianka w/[facility] @[phone]    ----- Message -----  From: Maria Fernanda Perales, NUPUR  Sent: 3/19/2024   4:43 PM CDT  To: Ayla Whitehead PharmD  Subject: FW: refill                                       Is [facility] associated / [facility]?  ----- Message -----  From: Santa Wynne  Sent: 3/19/2024   4:42 PM CDT  To: Gisell Soler Staff  Subject: refill                                           Caller requesting refill of medication listed. Pls call to discuss.   vedolizumab (ENTYVIO PEN) 108 mg/0.68 mL RicardoIj      [facility] [place] - [address]  [address] [address]  Phone: [phone] Fax: [phone]

## 2024-03-21 NOTE — TELEPHONE ENCOUNTER
Incoming call from  [facility] Phoebe ([facility] [place] rep) stating she spoke with [facility] and confirmed [facility] in [place] is in-network.    Rep requested Entyvio SC RX be re-sent to  [facility] in [place] with note on Rx: Already double checked with [facility]- [facility] [place] should fill med.

## 2024-03-25 NOTE — TELEPHONE ENCOUNTER
Incoming call from  [facility] ([facility] rep) - Entyvio pen Rx was transferred to [facility] [place] given [place] location cannot order/ stock Entyvio pen given limited distribution.   Rep will marcus [facility] [place] to clarify order status then call me back.

## 2024-03-25 NOTE — TELEPHONE ENCOUNTER
Incoming call from  [facility] ([facility] rep)-  [facility] is also not able to order Entyvio pen.  Rep called [facility]-  given [facility] and [facility] cannot order Entyvio pen, Rx should be sent to [facility] [place] (Phone: [phone])  [facility] should not need an override to process Rx for patient.    E-script sent to [facility].  [employer] message sent to update patient.

## 2024-03-25 NOTE — TELEPHONE ENCOUNTER
Outgoing call to [facility] ([place]) [phone]- rep Nadeen stated that [facility] ([place]) can only order Entyvio syringes (not pens) so Rx needs to be transferred to [facility].  Advised rep that SC Entyvio only comes in pens.   Requested call back from [facility], rep that I spoke with on 3/21/24.

## 2024-04-08 ENCOUNTER — PATIENT MESSAGE (OUTPATIENT)
Dept: GASTROENTEROLOGY | Facility: CLINIC | Age: 39
End: 2024-04-08
Payer: COMMERCIAL

## 2024-04-08 DIAGNOSIS — T45.1X5A IMMUNODEFICIENCY DUE TO LONG TERM IMMUNOSUPPRESSIVE DRUG THERAPY: ICD-10-CM

## 2024-04-08 DIAGNOSIS — D84.821 IMMUNODEFICIENCY DUE TO LONG TERM IMMUNOSUPPRESSIVE DRUG THERAPY: ICD-10-CM

## 2024-04-08 DIAGNOSIS — K51.00 ULCERATIVE PANCOLITIS: Primary | ICD-10-CM

## 2024-04-08 DIAGNOSIS — Z79.899 IMMUNODEFICIENCY DUE TO LONG TERM IMMUNOSUPPRESSIVE DRUG THERAPY: ICD-10-CM

## 2024-04-19 DIAGNOSIS — K51.00 ULCERATIVE PANCOLITIS: ICD-10-CM

## 2024-04-19 RX ORDER — MESALAMINE 1.2 G/1
4.8 TABLET, DELAYED RELEASE ORAL
Qty: 360 TABLET | Refills: 0 | Status: SHIPPED | OUTPATIENT
Start: 2024-04-19

## 2024-04-19 RX ORDER — AZATHIOPRINE 50 MG/1
100 TABLET ORAL DAILY
Qty: 60 TABLET | Refills: 0 | Status: SHIPPED | OUTPATIENT
Start: 2024-04-19 | End: 2024-05-16

## 2024-04-24 NOTE — TELEPHONE ENCOUNTER
"Called [facility]- rep Michael states Saleem is going through insurance for $150 copay.  Rx delivery set for 4/26.   Copay card is rejecting for "new processor"  Called Entyvio Connect- Spoke with Wendy from Claims Processing Dept- pt enrolled and has $20,000 in funds.  Given issues with [employer], copay card is rejecting and Entyvio support program is allowing patient to submit for reimbursement.     - If patient pays the $150 copay upfront, she can submit for $145 re-imbursement by submitting a picture of the product dispensed and payment receipt  via Fax [phone]    -Portal message sent to patient.    "

## 2024-05-14 DIAGNOSIS — K51.00 ULCERATIVE PANCOLITIS: ICD-10-CM

## 2024-05-15 ENCOUNTER — CLINICAL SUPPORT (OUTPATIENT)
Dept: GASTROENTEROLOGY | Facility: CLINIC | Age: 39
End: 2024-05-15
Payer: COMMERCIAL

## 2024-05-15 ENCOUNTER — PATIENT MESSAGE (OUTPATIENT)
Dept: GASTROENTEROLOGY | Facility: CLINIC | Age: 39
End: 2024-05-15

## 2024-05-15 DIAGNOSIS — K51.00 ULCERATIVE PANCOLITIS: Primary | ICD-10-CM

## 2024-05-15 NOTE — PROGRESS NOTES
[facility]  Inflammatory Bowel Disease  Pharmacy Note    TODAY'S VISIT DATE:  5/15/2024    Reason for visit: SC Entyvio injection training and follow up    IBD MD:  Gisell    Pertinent History:  IBD phenotype:  ulcerative colitis (pancolitis)   Signs and symptoms:    BM/ night time BM: 1-3 BM/d (tends to have 3 BM/d if stressed or operating) soft to felicia.  No nocturnal.    Patient has noticed stool consistency has gradually changed from formed to soft to felicia since getting off prednisone 2/29/24.  Stress related to work may also be contributing.  Blood/ Mucus:  rare blood on TP (not in bowl or mixed in stool), No mucus  Abdominal pain: no   Nausea/ vomiting:  no  Appetite: stable  Rectal urgency: no   Current therapy:  Entyvio 108 mg SC q 2 weeks (IV #1 2/10, #2 on 2/21, #3 on 3/23, due to start SC on 5/18/24), imuran 100 mg/d, lialda 4.8 g/d   Dispensing pharmacy/infusion center:  [facility]   Symptoms of infection (current or past 1 week)? (fever >100.4 F, URI, flu-like symptoms, cough, painful urination, warm/red/painful skin or skin ulcers/wounds, tooth pain): No  Recent Antibiotics use in the last 30 days: No  Last office visit with Dr Jameson 2/28/24 2/28/24 stool calprotectin: 57 (normal)  2/29/24: stopped prednisone as instructed  Patient reports she may taking job at [employer] which is causing stress.  If patient accepts position, she may move mid August 2024.       Therapeutic Drug Monitoring Labs:  3/26/15 6TG 130/6MMP <5700  4/29/15 6TG 210, 6MMP 6408  10/1/18 6TG 98, 6MMP 5136  12/24/20 trough simponi levels 1.1/Abs negative   12/5/23 trough simponi level 0.9/ neg Ab  12/5/23 6TG 157, 6MMP 1137    Prior IBD Therapies:  Prednisone- effective, last course 1/9/24-2/29/24  canasa 1000 mg CT QHS (2/15/2019, took for 4 weeks)  Simponi 100 mg SC q 4 weeks (2/2015-1/2/24)- low levels, lost response     Vaccinations:  Lab Results   Component Value Date    HEPBSAB Positive (A)  "10/01/2018     No results found for: "HEPBSURFABQU"  Lab Results   Component Value Date    HEPAIGG Positive (A) 10/01/2018     Lab Results   Component Value Date    VARICELLAZOS 3.79 (H) 10/01/2018    VARICELLAINT Positive (A) 10/01/2018     No results found for: "MUMPSIGGSCRE", "MUMPSIGGINTE"  No results found for: "RUBEOLAIGGAN", "RUBEOLAINTER"  Immunization History   Administered Date(s) Administered    COVID-19, MRNA, LN-S, PF (Pfizer) (Gray Cap) 04/13/2022    COVID-19, MRNA, LN-S, PF (Pfizer) (Purple Cap) 12/22/2020, 01/12/2021, 11/02/2021    COVID-19, mRNA, LNP-S, bivalent booster, PF (PFIZER OMICRON) 11/30/2022    Influenza 10/02/2020    Influenza - Quadrivalent - High Dose - PF (65 years and older) 10/25/2023    Influenza - Quadrivalent - MDCK - PF 10/14/2022    Influenza - Quadrivalent - PF *Preferred* (6 months and older) 10/06/2021    Influenza - Trivalent - PF (ADULT) 10/02/2013    MMR 03/10/2016    Pneumococcal Conjugate - 20 Valent 05/12/2023    Tdap 01/01/2011, 12/24/2015    Zoster Recombinant 05/12/2023, 10/25/2023       Influenza: recommended yearly   PCV 20: up-to-date, received 5/12/23  Tetanus (TdaP): booster recommended every 10 years, next 12/2025  HPV:  N/A   Meningococcal: not sure, N/A  Hepatitis B:  immune  Hepatitis A:  Immune  MMR (live vaccine):      Immune  Chickenpox status/Varicella (live vaccine): Immune  Shingrix: up-to-date, received 5/2023 and 10/2023  Covid:  per CDC recommendations  RSV: when 60+      All Medical History/Surgical History/Family History/Social History/Allergies have been reviewed and updated in EMR    Review of patient's allergies indicates:  No Known Allergies      Current Medications:   No outpatient medications have been marked as taking for the 5/15/24 encounter (Appointment) with IBD PHARMACIST.       Reviewed all current medications including OTC, herbals and supplements.     Labs:   Lab Results   Component Value Date    HEPBSAG Negative 12/27/2023    " HEPBCAB Negative 12/27/2023     Lab Results   Component Value Date    TBGOLDPLUS Negative 10/01/2018     Lab Results   Component Value Date    XVSPMFBX75QT 33.2 08/17/2023    NYACDYIF60 475 08/12/2022     Lab Results   Component Value Date    WBC 5.1 12/27/2023    HGB 13.0 12/27/2023    HCT 40.6 12/27/2023    MCV 95 12/27/2023     12/27/2023     Lab Results   Component Value Date    CREATININE 0.84 12/27/2023    ALBUMIN 4.6 12/27/2023    BILITOT <0.2 12/27/2023    ALKPHOS 32 (L) 07/03/2020    AST 14 12/27/2023    ALT 11 12/27/2023         Assessment/Plan:  Emily Pennington is a 39 y.o. female that  has a past medical history of GERD (gastroesophageal reflux disease), History of leukocytoclastic vasculitis with HSP, and Ulcerative pancolitis.    # Recommendations:    # Injection technique   - Educated patient on proper SC Entyvio injection technique steps including: let pen reach room temperature 30 minutes prior to injection, hand hygiene prior to injecting, inspect the pen (medicine should be clear and colorless to pale yellow, normal to see an air bubble), choosing and cleaning injection site (top of thighs or abdomen 2 inches away from the belly button), remove the cap, inject on 90 degree angle, the click indicates start of the injection, keep pressing until purple indicator fills viewing window.  - Discussed proper storage and disposal. Pt to keep pens in the fridge, ok to leave in room temp for up to 7 days. Dispose in sharps container    # Maintenance recommendations   - Reminded pt to practice proper hand hygiene considering increased risk of infection with biologics. Pt to make us aware if sheever experiences s/sx of an infection including fever, chills, URI symptoms or UTI symptoms.  - Discussed importance of immunizations considering the increased risk of infections. Patient is up to date.  - Pt to avoid live vaccines and uncooked/ raw seafood such as raw oysters or sushi.   - Annual skin exam-  recommended.  Patient working on scheduling  - Annual pap smear- recommended.  Patient working on scheduling  - Mammogram- will discuss with PCP (has apt today with PCP)  - Labs: CBC/CMP 12/2023, was due 3/2024 ([facility] slips already emailed to patient- recommended patient get CBC CMP completed ASAP. Patient agreed); TB neg 8/2023, repeat now; Hep B neg 12/2023, repeat 12/2024  - Vitamin D- patient was advised to restart vit D3 2000 IU/d at last visit but has not yet due to being busy.  Reminded patient.  She plans to restart soon.  - Patient verbalized understanding instructions      Carleen ColemanD  IBD Clinical Pharmacist

## 2024-05-15 NOTE — Clinical Note
Can you send lab slip order via [facility]? Order placed. Thank you very much.

## 2024-05-15 NOTE — Clinical Note
Pt had virtual apt for SC Entyvio inj training and follow up today.  Last OV with Dr Jameson in 2/2024 noted plan for colonoscopy 8/2024 (after 6 mos of entyvio) which has not been scheduled yet.  Pt has next apt with Dr Jameson 9/11/24.  Dr Jameson- I told patient I would share her symptom update with you and office will be in touch to confirm if she needs scope before vs after her next apt with you.   Note: Patient may be moving to [place] for a new job in mid-August but plans not finalized yet

## 2024-05-16 ENCOUNTER — TELEPHONE (OUTPATIENT)
Dept: GASTROENTEROLOGY | Facility: CLINIC | Age: 39
End: 2024-05-16
Payer: COMMERCIAL

## 2024-05-16 RX ORDER — AZATHIOPRINE 50 MG/1
100 TABLET ORAL
Qty: 60 TABLET | Refills: 0 | Status: SHIPPED | OUTPATIENT
Start: 2024-05-16 | End: 2024-06-11

## 2024-05-16 NOTE — TELEPHONE ENCOUNTER
Primary provider: Gisell    IBD medications Entyvio 108 mg SC q 2 weeks (IV #1 2/10, #2 on 2/21, #3 on 3/23, due to start SC on 5/18/24), imuran 100 mg/d, lialda 4.8 g/d     Refill request for Imuran 100 mg/d    Allergies reviewed Yes    Drug Monitoring labs/frequency for all IBD meds:  CBC CMP q3 months, TB and HepB yearly     Lab Results   Component Value Date    HEPBSAG Negative 12/27/2023    HEPBCAB Negative 12/27/2023     Lab Results   Component Value Date    TBGOLDPLUS Negative 10/01/2018     Lab Results   Component Value Date    QUANTNILVALU 0.01 08/17/2023    QUANTTBGDPL Negative 08/17/2023      Lab Results   Component Value Date    AGWMFDMP16ZU 33.2 08/17/2023    BGXUXHTL42 475 08/12/2022     Lab Results   Component Value Date    WBC 5.1 12/27/2023    HGB 13.0 12/27/2023    HCT 40.6 12/27/2023    MCV 95 12/27/2023     12/27/2023     Lab Results   Component Value Date    CREATININE 0.84 12/27/2023    ALBUMIN 4.6 12/27/2023    BILITOT <0.2 12/27/2023    ALKPHOS 32 (L) 07/03/2020    AST 14 12/27/2023    ALT 11 12/27/2023       Lab due date (mo/yr): overdue (3/2024)    Labs scheduled: Patient sent lab slips via portal for [facility]       Next appt: 9/11/24    RX refill sent to provider for amount until next labs: No- 1 month supply sent in.  During virtual PharmD visit, patient agreed to going to [facility] soon to get CBC and CMP done.

## 2024-05-29 ENCOUNTER — TELEPHONE (OUTPATIENT)
Dept: GASTROENTEROLOGY | Facility: CLINIC | Age: 39
End: 2024-05-29
Payer: COMMERCIAL

## 2024-05-29 NOTE — TELEPHONE ENCOUNTER
----- Message from Chana Aguilera RN sent at 5/28/2024 11:55 AM CDT -----  Regarding: FW: Prescription  Contact: Maria A @ [phone]    ----- Message -----  From: Cyn Winchester  Sent: 5/28/2024  11:10 AM CDT  To: Gisell Soler Staff  Subject: Prescription                                     Maria A from [employer] need replacement prescription for vedolizumab (ENTYVIO) 300 mg SolR injection. Fax [phone]

## 2024-06-03 NOTE — TELEPHONE ENCOUNTER
- Current IBD meds: Entyvio 108 mg SC q 2 weeks (started 2/10/24, 1st SC injection 5/18/24; LD: 6/2- was due 6/1 though out of town, ND: 6/15), Imuran 100 mg/ QD, Lialda 4.8 g/ QD  - Prednisone taper (1/9/24-2/29/24)    - Reports worsening symptoms for 4-6 weeks though worse this past weekend on Friday & Saturday  - 3-6 liquid, soft, small pellet BM/ QD w/ blood on the toilet paper though unsure how often (5/15 reported 1-3 soft/ small pellet stools w/ rare blood on the toilet paper)  - 1 episode of suzy blood in the toilet bowl & mixed in the stool on 6/1; does not think additional stools have had blood mixed in the stool or in the toilet bowl  - LLQ pain improved today  - Denies fevers, SOB, chest pain, dizziness, etc.  - States she is about to go in for another OR case & requesting further communication be completed via portal messages  - Will update Dr. Jameson

## 2024-06-03 NOTE — TELEPHONE ENCOUNTER
LM for callback [id number]    - Current IBD meds: Entyvio 108 mg SC q 2 weeks (started 2/10/24, 1st SC injection 5/18/24; LD: 6/2- was due 6/1 though out of town, ND: 6/15), Imuran 100 mg/ QD, Lialda 4.8 g/ QD  - Prednisone taper (1/9/24-2/29/24)

## 2024-06-06 NOTE — TELEPHONE ENCOUNTER
- Called [employer] ([id number])-  Yanet stated replacement case was approved.    Was [facility] pharmac[facility] Y[facility]dispensing pharmacy for [employer] [facility])    - Verbal Rx given for Entyvio 108 mg/0.68 mL pen x 1 pen No refills. Sig: Inject 108 mg into the skin every 14 (fourteen) days.     - Pharmacy will reach out to pt within 24-48 business hours to arrange delivery.  If patient has not heard by Tuesday of next week, she can call [phone]     [place] message sent to update patient.

## 2024-06-10 ENCOUNTER — TELEPHONE (OUTPATIENT)
Dept: GASTROENTEROLOGY | Facility: CLINIC | Age: 39
End: 2024-06-10
Payer: COMMERCIAL

## 2024-06-10 DIAGNOSIS — K51.00 ULCERATIVE PANCOLITIS: Primary | ICD-10-CM

## 2024-06-10 DIAGNOSIS — K51.00 ULCERATIVE PANCOLITIS: ICD-10-CM

## 2024-06-10 RX ORDER — PREDNISONE 10 MG/1
40 TABLET ORAL DAILY
Qty: 70 TABLET | Refills: 0 | Status: SHIPPED | OUTPATIENT
Start: 2024-06-10 | End: 2024-06-18 | Stop reason: SDUPTHER

## 2024-06-10 NOTE — TELEPHONE ENCOUNTER
Primary provider: DIANA    IBD medications - Entyvio 108 mg SC q2 weeks (IV doses completed 2/10/24, 2/21/24, and 3/23/2024, started SC 5/18/24), imuran 100 mg/d, lialda 4.8 g/d     Refill request for imuran 100 mg/d    Allergies reviewed Yes    Drug Monitoring labs/frequency for all IBD meds:  CBC CMP q3 months, TB and HepB yearly    Lab Results   Component Value Date    HEPBSAG Negative 12/27/2023    HEPBCAB Negative 12/27/2023     Lab Results   Component Value Date    QUANTNILVALU 0.01 08/17/2023    QUANTTBGDPL Negative 08/17/2023      Lab Results   Component Value Date    UPIADKIB50OH 33.2 08/17/2023    FXXPNBRD95 475 08/12/2022     Lab Results   Component Value Date    WBC 5.1 12/27/2023    HGB 13.0 12/27/2023    HCT 40.6 12/27/2023    MCV 95 12/27/2023     12/27/2023     Lab Results   Component Value Date    CREATININE 0.84 12/27/2023    ALBUMIN 4.6 12/27/2023    BILITOT <0.2 12/27/2023    ALKPHOS 32 (L) 07/03/2020    AST 14 12/27/2023    ALT 11 12/27/2023       Lab due date (mo/yr):  9/2024 (last completed 6/2024 at [facility]- results not in EPIC yet)    Labs scheduled: no - pt has Dr Jameson appt 9/11/24    Next appt: 9/11/24    RX refill sent to provider for amount until next labs:  Will confirm plan of care + follow up on 6/2024 [facility] results first

## 2024-06-10 NOTE — TELEPHONE ENCOUNTER
LAURENT Pennington   to CLARY Soler Staff (supporting Carleen White, PharmD)         6/7/24 12:49 PM  Thanks I haven't heard anything yet from entyvio but will let you know! I just did a ton of labs today and also dropped off the fecal calprotectin.

## 2024-06-11 ENCOUNTER — TELEPHONE (OUTPATIENT)
Dept: ENDOSCOPY | Facility: HOSPITAL | Age: 39
End: 2024-06-11
Payer: COMMERCIAL

## 2024-06-11 ENCOUNTER — TELEPHONE (OUTPATIENT)
Dept: GASTROENTEROLOGY | Facility: CLINIC | Age: 39
End: 2024-06-11
Payer: COMMERCIAL

## 2024-06-11 LAB
ALBUMIN SERPL-MCNC: 4.1 G/DL (ref 3.9–4.9)
ALBUMIN/GLOB SERPL: 1.3 {RATIO} (ref 1.2–2.2)
ALP SERPL-CCNC: 49 IU/L (ref 44–121)
ALT SERPL-CCNC: 6 IU/L (ref 0–32)
AST SERPL-CCNC: 13 IU/L (ref 0–40)
BASOPHILS # BLD AUTO: 0 X10E3/UL (ref 0–0.2)
BASOPHILS NFR BLD AUTO: 0 %
BILIRUB SERPL-MCNC: 0.3 MG/DL (ref 0–1.2)
BUN SERPL-MCNC: 15 MG/DL (ref 6–24)
BUN/CREAT SERPL: 16 (ref 9–23)
CALCIUM SERPL-MCNC: 9.1 MG/DL (ref 8.7–10.2)
CHLORIDE SERPL-SCNC: 102 MMOL/L (ref 96–106)
CO2 SERPL-SCNC: 23 MMOL/L (ref 20–29)
CREAT SERPL-MCNC: 0.94 MG/DL (ref 0.57–1)
EOSINOPHIL # BLD AUTO: 0.3 X10E3/UL (ref 0–0.4)
EOSINOPHIL NFR BLD AUTO: 4 %
ERYTHROCYTE [DISTWIDTH] IN BLOOD BY AUTOMATED COUNT: 12.9 % (ref 11.7–15.4)
EST. GFR  (NO RACE VARIABLE): 79 ML/MIN/1.73
GAMMA INTERFERON BACKGROUND BLD IA-ACNC: 0.03 IU/ML
GLOBULIN SER CALC-MCNC: 3.2 G/DL (ref 1.5–4.5)
GLUCOSE SERPL-MCNC: 92 MG/DL (ref 70–99)
HCT VFR BLD AUTO: 33.8 % (ref 34–46.6)
HGB BLD-MCNC: 11 G/DL (ref 11.1–15.9)
IMM GRANULOCYTES # BLD AUTO: 0 X10E3/UL (ref 0–0.1)
IMM GRANULOCYTES NFR BLD AUTO: 0 %
LYMPHOCYTES # BLD AUTO: 1.2 X10E3/UL (ref 0.7–3.1)
LYMPHOCYTES NFR BLD AUTO: 17 %
M TB IFN-G BLD-IMP: NEGATIVE
M TB IFN-G CD4+ BCKGRND COR BLD-ACNC: 0.03 IU/ML
M TB IFN-G CD4+CD8+ BCKGRND COR BLD-ACNC: 0.02 IU/ML
MCH RBC QN AUTO: 30.5 PG (ref 26.6–33)
MCHC RBC AUTO-ENTMCNC: 32.5 G/DL (ref 31.5–35.7)
MCV RBC AUTO: 94 FL (ref 79–97)
MITOGEN IGNF BCKGRD COR BLD-ACNC: >10 IU/ML
MONOCYTES # BLD AUTO: 0.4 X10E3/UL (ref 0.1–0.9)
MONOCYTES NFR BLD AUTO: 5 %
NEUTROPHILS # BLD AUTO: 5.3 X10E3/UL (ref 1.4–7)
NEUTROPHILS NFR BLD AUTO: 74 %
PLATELET # BLD AUTO: 354 X10E3/UL (ref 150–450)
POTASSIUM SERPL-SCNC: 4.6 MMOL/L (ref 3.5–5.2)
PROT SERPL-MCNC: 7.3 G/DL (ref 6–8.5)
QUANTIFERON TB GOLD (INCUBATED): NORMAL
RBC # BLD AUTO: 3.61 X10E6/UL (ref 3.77–5.28)
SERVICE CMNT-IMP: NORMAL
SODIUM SERPL-SCNC: 139 MMOL/L (ref 134–144)
WBC # BLD AUTO: 7.3 X10E3/UL (ref 3.4–10.8)

## 2024-06-11 RX ORDER — AZATHIOPRINE 50 MG/1
100 TABLET ORAL
Qty: 180 TABLET | Refills: 0 | Status: SHIPPED | OUTPATIENT
Start: 2024-06-11

## 2024-06-11 NOTE — TELEPHONE ENCOUNTER
CBC + CMP 6/2024 results received from [facility], repeat 9/2024.   TB in process at [facility]  HepB neg 12/2023

## 2024-06-11 NOTE — TELEPHONE ENCOUNTER
"Contacted the patient to schedule an endoscopy procedure(s) colonoscopy. The patient did not answer the call and left a voice message requesting a call back.        ----- Message -----   From: Maria Fernanda Perales RN   Sent: 2024  10:15 AM CDT   To: Gisell Soler Staff; *     Procedure: Colonoscopy     Diagnosis: Ulcerative Colitis     Procedure Timin-12 weeks; 2024     *If within 4 weeks selected, please massimo as high priority*     *If greater than 12 weeks, please select "5-12 weeks" and delay sending until 3 months prior to requested date*     Provider: Dr. BRODERICK Jameson     Location: [address]     Additional Scheduling Information: No scheduling concerns     Prep Specifications:Standard prep     Is the patient taking a GLP-1 Agonist:no     Have you attached a patient to this message: yes   "

## 2024-06-11 NOTE — TELEPHONE ENCOUNTER
Facility Name: [facility]  Collected: 06/07/24  Ordering provider: BRODERICK Jameson    CBC Results  WBC: 7.3   Hgb: 11.0  Hct: 33.8  Platelets: 354    CMP/ LFT Results  Na:139  K: 4.6  BUN: 15  Creatinine: 0.94  Alk Phos: 49  Albumin: 4.1  Total Bili: 0.3  AST: 13  ALT: 6

## 2024-06-12 ENCOUNTER — TELEPHONE (OUTPATIENT)
Dept: ENDOSCOPY | Facility: HOSPITAL | Age: 39
End: 2024-06-12
Payer: COMMERCIAL

## 2024-06-12 ENCOUNTER — TELEPHONE (OUTPATIENT)
Dept: GASTROENTEROLOGY | Facility: CLINIC | Age: 39
End: 2024-06-12
Payer: COMMERCIAL

## 2024-06-12 ENCOUNTER — PATIENT MESSAGE (OUTPATIENT)
Dept: GASTROENTEROLOGY | Facility: CLINIC | Age: 39
End: 2024-06-12
Payer: COMMERCIAL

## 2024-06-12 DIAGNOSIS — K51.919 ULCERATIVE COLITIS WITH COMPLICATION, UNSPECIFIED LOCATION: Primary | ICD-10-CM

## 2024-06-12 NOTE — TELEPHONE ENCOUNTER
"----- Message from Ryder Jameson MD sent at 2024  4:15 PM CDT -----  Seda  For bowel prep we can do suprep  For dates if she is okay with it she can have it done with Dr. Karimi if it helps. Just let her know he is also IBD specialist and this may give her other options.     SS  ----- Message -----  From: Sedrick Lee MA  Sent: 2024   3:49 PM CDT  To: Ryder Jameson MD; Chana Aguilera, RN    ,    Patient wants to schedule for  but want to see if you can move her to  Informed patient you are only available on  she states she has clinic all day and is moving in August and won't be able to have the procedure done. Also when asked which bowel prep she states whatever the doctor want me to have I told her you stated it was the patient's choice she states she will contact you.  Just an FYI  I have schedule her for .    Seda  ----- Message -----  From: Chana Aguilera, NUPUR  Sent: 2024   1:53 PM CDT  To: Sedrick Lee MA; #    Procedure: Colonoscopy    Diagnosis: Ulcerative colitis    Procedure Timin-12 weeks; 2024  ## Needs same day OV, let RN know when scheduled ##    #If within 4 weeks selected, please massimo as high priority#    #If greater than 12 weeks, please select "5-12 weeks" and delay sending until 2 months prior to requested date#     Provider: BRODERICK Jameson    Location: [facility]    Additional Scheduling Information: No scheduling concerns    Prep Specifications:Standard prep; Pt choice    Is the patient taking a GLP-1 Agonist:no    Have you attached a patient to this message: yes  "

## 2024-06-12 NOTE — TELEPHONE ENCOUNTER
Pending a call back from patient to see which prep she will be using. Also sent a message to the doctor on this matter.

## 2024-06-14 ENCOUNTER — LAB VISIT (OUTPATIENT)
Dept: LAB | Facility: HOSPITAL | Age: 39
End: 2024-06-14
Attending: INTERNAL MEDICINE
Payer: COMMERCIAL

## 2024-06-14 ENCOUNTER — TELEPHONE (OUTPATIENT)
Dept: GASTROENTEROLOGY | Facility: CLINIC | Age: 39
End: 2024-06-14
Payer: COMMERCIAL

## 2024-06-14 ENCOUNTER — TELEPHONE (OUTPATIENT)
Dept: ENDOSCOPY | Facility: HOSPITAL | Age: 39
End: 2024-06-14
Payer: COMMERCIAL

## 2024-06-14 ENCOUNTER — PATIENT MESSAGE (OUTPATIENT)
Dept: GASTROENTEROLOGY | Facility: CLINIC | Age: 39
End: 2024-06-14
Payer: COMMERCIAL

## 2024-06-14 DIAGNOSIS — Z12.11 ENCOUNTER FOR SCREENING COLONOSCOPY FOR NON-HIGH-RISK PATIENT: Primary | ICD-10-CM

## 2024-06-14 DIAGNOSIS — K51.00 ULCERATIVE PANCOLITIS: ICD-10-CM

## 2024-06-14 PROCEDURE — 80280 DRUG ASSAY VEDOLIZUMAB: CPT | Performed by: INTERNAL MEDICINE

## 2024-06-14 PROCEDURE — 82397 CHEMILUMINESCENT ASSAY: CPT | Performed by: INTERNAL MEDICINE

## 2024-06-14 PROCEDURE — 36415 COLL VENOUS BLD VENIPUNCTURE: CPT | Performed by: INTERNAL MEDICINE

## 2024-06-14 NOTE — TELEPHONE ENCOUNTER
----- Message from Ryder Jameson MD sent at 6/14/2024  9:29 AM CDT -----  Can we call [facility] and see if stool calprotectin has resulted please?    SS

## 2024-06-17 ENCOUNTER — TELEPHONE (OUTPATIENT)
Dept: ENDOSCOPY | Facility: HOSPITAL | Age: 39
End: 2024-06-17
Payer: COMMERCIAL

## 2024-06-17 ENCOUNTER — TELEPHONE (OUTPATIENT)
Dept: GASTROENTEROLOGY | Facility: CLINIC | Age: 39
End: 2024-06-17
Payer: COMMERCIAL

## 2024-06-17 NOTE — TELEPHONE ENCOUNTER
----- Message from Chana Aguilera, RN sent at 6/11/2024  1:56 PM CDT -----  Pred taper 40mg started 6/10    IBD meds:   - Entyvio SQ q14d, LD: , ND:  - Azathioprine 100mg/d  - Lialda 4/8 G/d

## 2024-06-17 NOTE — TELEPHONE ENCOUNTER
Per PM in Pred taper 40mg started 6/10     IBD meds:   - Entyvio SQ q14d, LD: , ND: 6/29  - Azathioprine 100mg/d   - Lialda 4.8 G/d     Per PM on previous thread:  Update re: steroid taper: took 40mg this AM, have been having 1-2 glorious formed poops per day without blood. Started 40mg prednisone last Monday 6/10. Let me know if you want me to go down to 30 tomorrow. [place]24-monday 7/8 with access to internet and will take my entyvio pen to do injection on Saturday 6/29.

## 2024-06-18 ENCOUNTER — TELEPHONE (OUTPATIENT)
Dept: ENDOSCOPY | Facility: HOSPITAL | Age: 39
End: 2024-06-18
Payer: COMMERCIAL

## 2024-06-18 DIAGNOSIS — K51.00 ULCERATIVE PANCOLITIS: ICD-10-CM

## 2024-06-18 RX ORDER — PREDNISONE 10 MG/1
40 TABLET ORAL DAILY
Qty: 20 TABLET | Refills: 0 | Status: SHIPPED | OUTPATIENT
Start: 2024-06-18

## 2024-06-18 NOTE — TELEPHONE ENCOUNTER
Allergies reviewed, Rx pended for approval    OV: 09/11/24 (needs to be sooner d/t pt moving)    *Pt states only has enough to get to 6/30 if tapering by 10mg/wk. Original Rx for 70 tabs which would be the amount needed for the full taper, starting at 40mg/d and taper by 10mg/wk.*

## 2024-06-24 ENCOUNTER — TELEPHONE (OUTPATIENT)
Dept: GASTROENTEROLOGY | Facility: CLINIC | Age: 39
End: 2024-06-24
Payer: COMMERCIAL

## 2024-06-24 NOTE — TELEPHONE ENCOUNTER
Facility Name: [facility]  Collected: 6/7/24  Ordering provider: Dr. CLEOPATRA Jameson    Stool calprotectin: 488

## 2024-06-24 NOTE — TELEPHONE ENCOUNTER
Called [facility] and requested fax of lab results for patient. Received at 9:50AM on 6/24/24, given to NUPUR Zayas.

## 2024-06-24 NOTE — TELEPHONE ENCOUNTER
Called [facility] and requested fax of lab results for patient. Received at 10:10AM on 6/24/24, given to NUPUR Zayas

## 2024-06-25 ENCOUNTER — PATIENT MESSAGE (OUTPATIENT)
Dept: GASTROENTEROLOGY | Facility: CLINIC | Age: 39
End: 2024-06-25
Payer: COMMERCIAL

## 2024-07-08 ENCOUNTER — TELEPHONE (OUTPATIENT)
Dept: GASTROENTEROLOGY | Facility: CLINIC | Age: 39
End: 2024-07-08
Payer: COMMERCIAL

## 2024-07-08 NOTE — TELEPHONE ENCOUNTER
----- Message from Chana Aguilera, RN sent at 7/2/2024 11:37 AM CDT -----  IBD meds:   Entyvio SQ q2w, LD: 6/29, ND: 7/13     Prednisone 15 mg/d (started 6/3/24, 40mg/d 6/10, 30mg/d 6/17, 20mg/d 6/24, 15mg/d 7/1)

## 2024-07-10 RX ORDER — POLYETHYLENE GLYCOL 3350, SODIUM SULFATE ANHYDROUS, SODIUM BICARBONATE, SODIUM CHLORIDE, POTASSIUM CHLORIDE 236; 22.74; 6.74; 5.86; 2.97 G/4L; G/4L; G/4L; G/4L; G/4L
4 POWDER, FOR SOLUTION ORAL ONCE
Qty: 4000 ML | Refills: 0 | Status: CANCELLED | OUTPATIENT
Start: 2024-07-10 | End: 2024-07-10

## 2024-07-12 DIAGNOSIS — K51.00 ULCERATIVE PANCOLITIS: ICD-10-CM

## 2024-07-12 RX ORDER — PREDNISONE 10 MG/1
10 TABLET ORAL DAILY
Qty: 5 TABLET | Refills: 0 | Status: SHIPPED | OUTPATIENT
Start: 2024-07-12

## 2024-07-12 NOTE — TELEPHONE ENCOUNTER
Allergies reviewed, Rx pended for approval    OV: 9/11/24 - needs to be changed d/t moving, pt has not responded with date/time availability

## 2024-07-20 DIAGNOSIS — K51.00 ULCERATIVE PANCOLITIS: ICD-10-CM

## 2024-07-22 DIAGNOSIS — K51.00 ULCERATIVE PANCOLITIS: Primary | ICD-10-CM

## 2024-07-22 RX ORDER — MESALAMINE 1.2 G/1
TABLET, DELAYED RELEASE ORAL
Qty: 240 TABLET | Refills: 0 | Status: SHIPPED | OUTPATIENT
Start: 2024-07-22 | End: 2024-07-24

## 2024-07-22 NOTE — TELEPHONE ENCOUNTER
Primary provider: DIANA    IBD medications   Entyvio SQ q14d   Azathioprine 100mg/d   Lialda 4.8 G/d   Prednisone 5 mg/d    Refill request for Lialda 4.8 G/d     Allergies reviewed Yes    Drug Monitoring labs/frequency for all IBD meds:  CBC CMP q3 month, TB and HepB yearly    Lab Results   Component Value Date    HEPBSAG Negative 12/27/2023    HEPBCAB Negative 12/27/2023       Lab Results   Component Value Date    QUANTNILVALU 0.03 06/07/2024    QUANTTBGDPL Negative 06/07/2024      Lab Results   Component Value Date    WBC 7.3 06/07/2024    HGB 11.0 (L) 06/07/2024    HCT 33.8 (L) 06/07/2024    MCV 94 06/07/2024     06/07/2024     Lab Results   Component Value Date    CREATININE 0.94 06/07/2024    ALBUMIN 4.1 06/07/2024    BILITOT 0.3 06/07/2024    ALKPHOS 32 (L) 07/03/2020    AST 13 06/07/2024    ALT 6 06/07/2024       Lab due date (mo/yr):  9/2024    Labs scheduled: no - has scope 7/30/24 then Dr Jameson appt 8/7/24    Next appt:  8/7/24    RX refill sent to provider for amount until next labs: Yes

## 2024-07-23 ENCOUNTER — TELEPHONE (OUTPATIENT)
Dept: ENDOSCOPY | Facility: HOSPITAL | Age: 39
End: 2024-07-23
Payer: COMMERCIAL

## 2024-07-23 DIAGNOSIS — K51.00 ULCERATIVE PANCOLITIS: ICD-10-CM

## 2024-07-23 NOTE — TELEPHONE ENCOUNTER
Contacted Pt for Endoscopy precall to confirm scheduled procedure(s) Colonoscopy on 7/30/24. Pt did not answer. Left voicemail for pt to call Endoscopy Scheduling to confirm.      Several messages left for patient from scheduling to return call regarding prep.

## 2024-07-24 ENCOUNTER — PATIENT MESSAGE (OUTPATIENT)
Dept: GASTROENTEROLOGY | Facility: CLINIC | Age: 39
End: 2024-07-24
Payer: COMMERCIAL

## 2024-07-24 ENCOUNTER — TELEPHONE (OUTPATIENT)
Dept: ENDOSCOPY | Facility: HOSPITAL | Age: 39
End: 2024-07-24
Payer: COMMERCIAL

## 2024-07-24 DIAGNOSIS — Z12.11 ENCOUNTER FOR SCREENING COLONOSCOPY FOR NON-HIGH-RISK PATIENT: Primary | ICD-10-CM

## 2024-07-24 RX ORDER — MESALAMINE 1.2 G/1
4.8 TABLET, DELAYED RELEASE ORAL
Qty: 360 TABLET | Refills: 3 | Status: SHIPPED | OUTPATIENT
Start: 2024-07-24

## 2024-07-24 RX ORDER — SODIUM, POTASSIUM,MAG SULFATES 17.5-3.13G
1 SOLUTION, RECONSTITUTED, ORAL ORAL DAILY
Qty: 1 KIT | Refills: 0 | Status: SHIPPED | OUTPATIENT
Start: 2024-07-24 | End: 2024-07-26

## 2024-07-24 RX ORDER — MESALAMINE 1000 MG/1
1000 SUPPOSITORY RECTAL NIGHTLY
Qty: 30 SUPPOSITORY | Refills: 1 | Status: SHIPPED | OUTPATIENT
Start: 2024-07-24

## 2024-07-24 NOTE — TELEPHONE ENCOUNTER
Prescription for suprep sent to pts pharmacy [facility] [employer] in [place]. Reviewed instructions with pt. Will send pt instructions to pt portal. Pt verbalized understanding of all.

## 2024-07-24 NOTE — TELEPHONE ENCOUNTER
Allergies reviewed.   Script pended for 90- day supply as requested per pharmacy   Labs up to date   [Takes medication as prescribed] : takes [None] : Patient does not have any barriers to medication adherence

## 2024-07-24 NOTE — TELEPHONE ENCOUNTER
Pt returned call to confirm endoscopy. Pt has prep and instructions (or knows how to access instructions) and confirmed ride. Verified if any GLP1's and blood thinners. Encouraged to call back for any needs. Note made in case comments.

## 2024-07-26 ENCOUNTER — TELEPHONE (OUTPATIENT)
Dept: ENDOSCOPY | Facility: HOSPITAL | Age: 39
End: 2024-07-26
Payer: COMMERCIAL

## 2024-07-26 NOTE — TELEPHONE ENCOUNTER
Contacted Pt for Endoscopy precall to confirm scheduled procedure(s) Colonoscopy on 7/30/24. Pt did not answer. Left voicemail for pt to call Endoscopy Scheduling to confirm.

## 2024-07-30 ENCOUNTER — ANESTHESIA (OUTPATIENT)
Dept: ENDOSCOPY | Facility: HOSPITAL | Age: 39
End: 2024-07-30
Payer: COMMERCIAL

## 2024-07-30 ENCOUNTER — HOSPITAL ENCOUNTER (OUTPATIENT)
Facility: HOSPITAL | Age: 39
Discharge: HOME | End: 2024-07-30
Attending: INTERNAL MEDICINE | Admitting: INTERNAL MEDICINE
Payer: COMMERCIAL

## 2024-07-30 ENCOUNTER — TELEPHONE (OUTPATIENT)
Dept: ENDOSCOPY | Facility: HOSPITAL | Age: 39
End: 2024-07-30
Payer: COMMERCIAL

## 2024-07-30 ENCOUNTER — ANESTHESIA EVENT (OUTPATIENT)
Dept: ENDOSCOPY | Facility: HOSPITAL | Age: 39
End: 2024-07-30
Payer: COMMERCIAL

## 2024-07-30 ENCOUNTER — PATIENT MESSAGE (OUTPATIENT)
Dept: GASTROENTEROLOGY | Facility: CLINIC | Age: 39
End: 2024-07-30
Payer: COMMERCIAL

## 2024-07-30 VITALS
HEIGHT: 65 IN | SYSTOLIC BLOOD PRESSURE: 113 MMHG | HEART RATE: 79 BPM | OXYGEN SATURATION: 100 % | TEMPERATURE: 97 F | DIASTOLIC BLOOD PRESSURE: 58 MMHG | RESPIRATION RATE: 15 BRPM | BODY MASS INDEX: 25.83 KG/M2 | WEIGHT: 155 LBS

## 2024-07-30 DIAGNOSIS — K51.00 ULCERATIVE PANCOLITIS: Primary | ICD-10-CM

## 2024-07-30 LAB
B-HCG UR QL: NEGATIVE
CTP QC/QA: YES

## 2024-07-30 PROCEDURE — 27201012 HC FORCEPS, HOT/COLD, DISP: Performed by: INTERNAL MEDICINE

## 2024-07-30 PROCEDURE — 25000003 PHARM REV CODE 250: Performed by: INTERNAL MEDICINE

## 2024-07-30 PROCEDURE — 45380 COLONOSCOPY AND BIOPSY: CPT | Mod: PT | Performed by: INTERNAL MEDICINE

## 2024-07-30 PROCEDURE — 25000003 PHARM REV CODE 250: Performed by: NURSE ANESTHETIST, CERTIFIED REGISTERED

## 2024-07-30 PROCEDURE — 88305 TISSUE EXAM BY PATHOLOGIST: CPT | Mod: 59 | Performed by: PATHOLOGY

## 2024-07-30 PROCEDURE — 37000009 HC ANESTHESIA EA ADD 15 MINS: Performed by: INTERNAL MEDICINE

## 2024-07-30 PROCEDURE — 81025 URINE PREGNANCY TEST: CPT | Performed by: INTERNAL MEDICINE

## 2024-07-30 PROCEDURE — 88342 IMHCHEM/IMCYTCHM 1ST ANTB: CPT | Performed by: PATHOLOGY

## 2024-07-30 PROCEDURE — 45380 COLONOSCOPY AND BIOPSY: CPT | Mod: 33,,, | Performed by: INTERNAL MEDICINE

## 2024-07-30 PROCEDURE — 37000008 HC ANESTHESIA 1ST 15 MINUTES: Performed by: INTERNAL MEDICINE

## 2024-07-30 PROCEDURE — 63600175 PHARM REV CODE 636 W HCPCS: Performed by: NURSE ANESTHETIST, CERTIFIED REGISTERED

## 2024-07-30 RX ORDER — PROPOFOL 10 MG/ML
VIAL (ML) INTRAVENOUS
Status: DISCONTINUED | OUTPATIENT
Start: 2024-07-30 | End: 2024-07-30

## 2024-07-30 RX ORDER — PROPOFOL 10 MG/ML
INJECTION, EMULSION INTRAVENOUS CONTINUOUS PRN
Status: DISCONTINUED | OUTPATIENT
Start: 2024-07-30 | End: 2024-07-30

## 2024-07-30 RX ORDER — LIDOCAINE HYDROCHLORIDE 20 MG/ML
INJECTION INTRAVENOUS
Status: DISCONTINUED | OUTPATIENT
Start: 2024-07-30 | End: 2024-07-30

## 2024-07-30 RX ORDER — SODIUM CHLORIDE 9 MG/ML
INJECTION, SOLUTION INTRAVENOUS CONTINUOUS
Status: DISCONTINUED | OUTPATIENT
Start: 2024-07-30 | End: 2024-07-30 | Stop reason: HOSPADM

## 2024-07-30 RX ADMIN — PROPOFOL 50 MG: 10 INJECTION, EMULSION INTRAVENOUS at 12:07

## 2024-07-30 RX ADMIN — PROPOFOL 150 MCG/KG/MIN: 10 INJECTION, EMULSION INTRAVENOUS at 12:07

## 2024-07-30 RX ADMIN — LIDOCAINE HYDROCHLORIDE 100 MG: 20 INJECTION INTRAVENOUS at 12:07

## 2024-07-30 RX ADMIN — PROPOFOL 100 MG: 10 INJECTION, EMULSION INTRAVENOUS at 12:07

## 2024-07-30 RX ADMIN — SODIUM CHLORIDE: 0.9 INJECTION, SOLUTION INTRAVENOUS at 12:07

## 2024-07-30 RX ADMIN — PROPOFOL 50 MG: 10 INJECTION, EMULSION INTRAVENOUS at 01:07

## 2024-07-30 NOTE — TELEPHONE ENCOUNTER
Received Called: Pt called stated she didn't know which location to report to from her upcoming procedure because they have two different locations on her prep instructions. I provided pt with the correct location.

## 2024-07-30 NOTE — TRANSFER OF CARE
Anesthesia Transfer of Care Note    Patient: Emily Pennington    Procedure(s) Performed: Procedure(s) (LRB):  COLONOSCOPY (N/A)    Patient location: GI    Anesthesia Type: general    Transport from OR: Transported from OR on room air with adequate spontaneous ventilation    Post pain: adequate analgesia    Post assessment: no apparent anesthetic complications    Post vital signs: stable    Level of consciousness: responds to stimulation and awake    Nausea/Vomiting: no nausea/vomiting    Complications: none    Transfer of care protocol was followed      Last vitals: Visit Vitals  /60   Pulse 75   Temp 36   Resp 16   Ht    Wt    SpO2 100%   Breastfeeding    BMI

## 2024-07-30 NOTE — ANESTHESIA POSTPROCEDURE EVALUATION
Anesthesia Post Evaluation    Patient: Emily Pennington    Procedure(s) Performed: Procedure(s) (LRB):  COLONOSCOPY (N/A)    Final Anesthesia Type: general      Patient location during evaluation: GI PACU  Patient participation: Yes- Able to Participate  Level of consciousness: awake and alert and oriented  Post-procedure vital signs: reviewed and stable  Pain management: adequate  Airway patency: patent    PONV status at discharge: No PONV  Anesthetic complications: no      Cardiovascular status: hemodynamically stable  Respiratory status: unassisted, spontaneous ventilation and room air  Hydration status: euvolemic  Follow-up not needed.              Vitals Value Taken Time   /58 07/30/24 1352   Temp 36.3 °C (97.4 °F) 07/30/24 1322   Pulse 79 07/30/24 1352   Resp 15 07/30/24 1352   SpO2 100 % 07/30/24 1352         No case tracking events are documented in the log.      Pain/Nikia Score: Nikia Score: 10 (7/30/2024  1:52 PM)

## 2024-07-30 NOTE — ANESTHESIA PREPROCEDURE EVALUATION
07/30/2024  Emily Pennington is a 39 y.o., female.    Past Medical History:   Diagnosis Date    GERD (gastroesophageal reflux disease)     History of leukocytoclastic vasculitis with HSP 12/2013    Ulcerative pancolitis 10/02/2018     Patient Active Problem List   Diagnosis    Ulcerative pancolitis    Vitamin B12 deficiency    Leukocytoclastic vasculitis    GERD (gastroesophageal reflux disease)    Immunodeficiency due to long term immunosuppressive drug therapy     Social History     Socioeconomic History    Marital status:    Tobacco Use    Smoking status: Never    Smokeless tobacco: Never   Substance and Sexual Activity    Alcohol use: Yes     Alcohol/week: 1.0 standard drink of alcohol     Types: 1 Glasses of wine per week    Drug use: No     Past Surgical History:   Procedure Laterality Date    COLONOSCOPY N/A 2/15/2019    Procedure: COLONOSCOPY;  Surgeon: Ryder Jameson MD;  Location: [address];  Service: Endoscopy;  Laterality: N/A;  schedule as 45 minute case    TONSILLECTOMY       No current facility-administered medications on file prior to encounter.     Current Outpatient Medications on File Prior to Encounter   Medication Sig Dispense Refill    azaTHIOprine (IMURAN) 50 mg Tab TAKE 2 TABLETS BY MOUTH EVERY  tablet 0    levonorgestrel-ethinyl estradiol (AVIANE,ALESSE,LESSINA) 0.1-20 mg-mcg per tablet Take 1 tablet by mouth once daily.      vedolizumab 108 mg/0.68 mL PnIj Inject 108 mg into the skin every 14 (fourteen) days. 1.36 mL 5     Pre-op Assessment    I have reviewed the NPO Status.      Review of Systems  Anesthesia Hx:               Denies Personal Hx of Anesthesia complications.                    Social:  Social Alcohol Use       Cardiovascular:  Cardiovascular Normal Exercise tolerance: good                                           Pulmonary:        Possible Obstructive Sleep Apnea , (STOP/BANG) Symptoms S - Snoring (loud) and O - Observed interrupted breathing during sleep                Renal/:  Renal/ Normal                 Hepatic/GI:     GERD      Bowel Conditions:  Inflammatory Bowel Disease, Ulcerative Colitis        Neurological:  Neurology Normal                                      Endocrine:  Endocrine Normal                Physical Exam    Airway:  Mallampati: II   Neck ROM: Normal ROM        Anesthesia Plan  Type of Anesthesia, risks & benefits discussed:    Anesthesia Type: Gen Natural Airway  Intra-op Monitoring Plan: Standard ASA Monitors  Induction:  IV  Informed Consent: Informed consent signed with the Patient and all parties understand the risks and agree with anesthesia plan.  All questions answered.   ASA Score: 2    Ready For Surgery From Anesthesia Perspective.     .

## 2024-07-31 ENCOUNTER — OFFICE VISIT (OUTPATIENT)
Dept: GASTROENTEROLOGY | Facility: CLINIC | Age: 39
End: 2024-07-31
Payer: COMMERCIAL

## 2024-07-31 ENCOUNTER — PATIENT MESSAGE (OUTPATIENT)
Dept: GASTROENTEROLOGY | Facility: CLINIC | Age: 39
End: 2024-07-31

## 2024-07-31 VITALS
TEMPERATURE: 98 F | OXYGEN SATURATION: 99 % | SYSTOLIC BLOOD PRESSURE: 111 MMHG | RESPIRATION RATE: 19 BRPM | DIASTOLIC BLOOD PRESSURE: 70 MMHG | BODY MASS INDEX: 27.4 KG/M2 | HEART RATE: 70 BPM | WEIGHT: 164.44 LBS | HEIGHT: 65 IN

## 2024-07-31 DIAGNOSIS — K51.00 ULCERATIVE PANCOLITIS: Primary | ICD-10-CM

## 2024-07-31 PROCEDURE — G2211 COMPLEX E/M VISIT ADD ON: HCPCS | Mod: S$GLB,,, | Performed by: INTERNAL MEDICINE

## 2024-07-31 PROCEDURE — 99215 OFFICE O/P EST HI 40 MIN: CPT | Mod: S$GLB,,, | Performed by: INTERNAL MEDICINE

## 2024-07-31 RX ORDER — MESALAMINE 1000 MG/1
1000 SUPPOSITORY RECTAL 2 TIMES DAILY
Qty: 60 SUPPOSITORY | Refills: 0 | Status: SHIPPED | OUTPATIENT
Start: 2024-07-31 | End: 2024-08-30

## 2024-07-31 NOTE — PATIENT INSTRUCTIONS
- canasa twice a day starting 8/1-8/15 and then nightly and stay on nightly until results of next stool calprotectin  - repeat stool calprotectin in 1 month- 8/29/24 at [facility]  - next labs are due in 9/2024 to monitor imuran  - continue entyvio every 2 week injections  - continue imuran 100 mg/d  - continue lialda 4.8 g/d  - skin exam as scheduled this week  - pap smear yearly due to chronic immunosuppression  - restart vit D3 2000 IU/d  - flu and covid vaccine this fall
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
## 2024-08-02 LAB
FINAL PATHOLOGIC DIAGNOSIS: NORMAL
GROSS: NORMAL
Lab: NORMAL

## 2024-08-13 ENCOUNTER — PATIENT MESSAGE (OUTPATIENT)
Dept: GASTROENTEROLOGY | Facility: CLINIC | Age: 39
End: 2024-08-13
Payer: COMMERCIAL

## 2024-08-27 NOTE — TELEPHONE ENCOUNTER
Called and spoke with Alecia   - PA to be done via phone on [phone]  - last shipment sent on 7/31      Called PA line and spoke with Arturo  - unable to complete PA via phone per plan requirements  - provided fax # for PA form to be faxed urgently

## 2024-08-28 NOTE — TELEPHONE ENCOUNTER
PA renewal form and supporting documents (clinical notes, labs, etc) faxed to [facility]. Successful fax transmittal e-mail received.     Fax # [phone].

## 2024-08-30 NOTE — TELEPHONE ENCOUNTER
Additional information required.   Completed form and supporting documents (clinical notes, labs, etc) faxed nack to [facility]. Successful fax transmittal e-mail received.

## 2024-09-03 NOTE — TELEPHONE ENCOUNTER
Saleem DOMINGUEZ PA approved through 8/29/2025  PA ref # [id number]  Approval letter uploaded under media.

## 2024-09-09 NOTE — TELEPHONE ENCOUNTER
- current IBD meds: Entyvio 108 mg SC q 2 weeks (started 300 mg IV  2/10/24, 2/24/24, 3/23/24, started SC injections q 2 weeks 5/18/24, LD 8/24), imuran 100 mg/d, lialda 4.8 g/d, canasa supp qhs (started 7/25/24)   - diagnosed with URI on 8/30, covid neg  - advised to hold imuran on 8/30; held entyvio on 9/7; continued lialda  - completed 10 day course of Augmentin today   - symptoms have significantly, has some lingering runny nose   - denies fevers since 8/30  - advised to resume imuran and entyvio  - plan of care dicussed with Dr. Jameson last week

## 2024-09-11 DIAGNOSIS — K51.00 ULCERATIVE PANCOLITIS: ICD-10-CM

## 2024-09-11 RX ORDER — VEDOLIZUMAB 108 MG/.68ML
INJECTION, SOLUTION SUBCUTANEOUS
Qty: 1.36 ML | Refills: 0 | Status: SHIPPED | OUTPATIENT
Start: 2024-09-11

## 2024-09-11 NOTE — TELEPHONE ENCOUNTER
"Primary provider:     IBD medications Entyvio 108 mg SC q 2 weeks (started 300 mg IV  2/10/24, 2/24/24, 3/23/24, started SC injections q 2 weeks 5/18/24), imuran 100 mg/d, lialda 4.8 g/d, canasa supp qhs (started 7/25/24)     Refill request for entyvio SC    Allergies reviewed Yes    Drug Monitoring labs/frequency for all IBD meds:  CBC CMP every 3mo TB and hep B yearly     Lab Results   Component Value Date    HEPBSAG Negative 12/27/2023    HEPBCAB Negative 12/27/2023     Lab Results   Component Value Date    TBGOLDPLUS Negative 10/01/2018     Lab Results   Component Value Date    QUANTNILVALU 0.03 06/07/2024    QUANTTBGDPL Negative 06/07/2024      No results found for: "TSPOTSCREN"  Lab Results   Component Value Date    MAAXPWPV52NT 33.2 08/17/2023    UNHZHJRY18 475 08/12/2022     Lab Results   Component Value Date    WBC 7.3 06/07/2024    HGB 11.0 (L) 06/07/2024    HCT 33.8 (L) 06/07/2024    MCV 94 06/07/2024     06/07/2024     Lab Results   Component Value Date    CREATININE 0.94 06/07/2024    ALBUMIN 4.1 06/07/2024    BILITOT 0.3 06/07/2024    ALKPHOS 32 (L) 07/03/2020    AST 13 06/07/2024    ALT 6 06/07/2024       Lab due date (mo/yr):  9/2024    Labs scheduled: no - but pt has moved to [place] and is establishing care with new GI provider on 9/19     Next appt: No follow up appt. Has ne GI in [place] - this is the last fill provided    RX refill sent to provider for amount until next labs: Yes 1 fill only to cover until she sees new GI MD      "